# Patient Record
Sex: FEMALE | Race: BLACK OR AFRICAN AMERICAN | NOT HISPANIC OR LATINO
[De-identification: names, ages, dates, MRNs, and addresses within clinical notes are randomized per-mention and may not be internally consistent; named-entity substitution may affect disease eponyms.]

---

## 2019-02-01 ENCOUNTER — APPOINTMENT (OUTPATIENT)
Dept: ANTEPARTUM | Facility: HOSPITAL | Age: 24
End: 2019-02-01

## 2019-02-01 ENCOUNTER — INPATIENT (INPATIENT)
Facility: HOSPITAL | Age: 24
LOS: 2 days | Discharge: HOME | End: 2019-02-04
Attending: OBSTETRICS & GYNECOLOGY | Admitting: OBSTETRICS & GYNECOLOGY
Payer: MEDICAID

## 2019-02-01 VITALS — HEIGHT: 60 IN | WEIGHT: 169.76 LBS

## 2019-02-01 DIAGNOSIS — O60.00 PRETERM LABOR WITHOUT DELIVERY, UNSPECIFIED TRIMESTER: ICD-10-CM

## 2019-02-01 RX ORDER — OXYTOCIN 10 UNIT/ML
333.33 VIAL (ML) INJECTION
Qty: 20 | Refills: 0 | Status: DISCONTINUED | OUTPATIENT
Start: 2019-02-01 | End: 2019-02-02

## 2019-02-01 RX ORDER — SODIUM CHLORIDE 9 MG/ML
1000 INJECTION, SOLUTION INTRAVENOUS
Qty: 0 | Refills: 0 | Status: DISCONTINUED | OUTPATIENT
Start: 2019-02-01 | End: 2019-02-02

## 2019-02-01 RX ORDER — CITRIC ACID/SODIUM CITRATE 300-500 MG
15 SOLUTION, ORAL ORAL EVERY 4 HOURS
Qty: 0 | Refills: 0 | Status: DISCONTINUED | OUTPATIENT
Start: 2019-02-01 | End: 2019-02-02

## 2019-02-01 RX ORDER — SODIUM CHLORIDE 9 MG/ML
1000 INJECTION, SOLUTION INTRAVENOUS ONCE
Qty: 0 | Refills: 0 | Status: DISCONTINUED | OUTPATIENT
Start: 2019-02-01 | End: 2019-02-02

## 2019-02-01 RX ADMIN — SODIUM CHLORIDE 125 MILLILITER(S): 9 INJECTION, SOLUTION INTRAVENOUS at 23:16

## 2019-02-02 ENCOUNTER — ASOB RESULT (OUTPATIENT)
Age: 24
End: 2019-02-02

## 2019-02-02 LAB
APPEARANCE UR: CLEAR — SIGNIFICANT CHANGE UP
BACTERIA # UR AUTO: NEGATIVE — SIGNIFICANT CHANGE UP
BASOPHILS # BLD AUTO: 0.01 K/UL — SIGNIFICANT CHANGE UP (ref 0–0.2)
BASOPHILS NFR BLD AUTO: 0.1 % — SIGNIFICANT CHANGE UP (ref 0–2)
BILIRUB UR-MCNC: NEGATIVE — SIGNIFICANT CHANGE UP
BLD GP AB SCN SERPL QL: NEGATIVE — SIGNIFICANT CHANGE UP
BLOOD UR QL VISUAL: SIGNIFICANT CHANGE UP
COLOR SPEC: YELLOW — SIGNIFICANT CHANGE UP
EOSINOPHIL # BLD AUTO: 0 K/UL — SIGNIFICANT CHANGE UP (ref 0–0.5)
EOSINOPHIL NFR BLD AUTO: 0 % — SIGNIFICANT CHANGE UP (ref 0–6)
GLUCOSE UR-MCNC: NEGATIVE — SIGNIFICANT CHANGE UP
HCT VFR BLD CALC: 28.7 % — LOW (ref 34.5–45)
HGB BLD-MCNC: 8.8 G/DL — LOW (ref 11.5–15.5)
HYALINE CASTS # UR AUTO: SIGNIFICANT CHANGE UP
IMM GRANULOCYTES NFR BLD AUTO: 1.3 % — SIGNIFICANT CHANGE UP (ref 0–1.5)
KETONES UR-MCNC: HIGH
LEUKOCYTE ESTERASE UR-ACNC: NEGATIVE — SIGNIFICANT CHANGE UP
LYMPHOCYTES # BLD AUTO: 1.09 K/UL — SIGNIFICANT CHANGE UP (ref 1–3.3)
LYMPHOCYTES # BLD AUTO: 11.6 % — LOW (ref 13–44)
MCHC RBC-ENTMCNC: 26.1 PG — LOW (ref 27–34)
MCHC RBC-ENTMCNC: 30.7 % — LOW (ref 32–36)
MCV RBC AUTO: 85.2 FL — SIGNIFICANT CHANGE UP (ref 80–100)
MONOCYTES # BLD AUTO: 0.16 K/UL — SIGNIFICANT CHANGE UP (ref 0–0.9)
MONOCYTES NFR BLD AUTO: 1.7 % — LOW (ref 2–14)
MUCOUS THREADS # UR AUTO: SIGNIFICANT CHANGE UP
NEUTROPHILS # BLD AUTO: 7.98 K/UL — HIGH (ref 1.8–7.4)
NEUTROPHILS NFR BLD AUTO: 85.3 % — HIGH (ref 43–77)
NITRITE UR-MCNC: NEGATIVE — SIGNIFICANT CHANGE UP
NRBC # FLD: 0 K/UL — LOW (ref 25–125)
PH UR: 6.5 — SIGNIFICANT CHANGE UP (ref 5–8)
PLATELET # BLD AUTO: 143 K/UL — LOW (ref 150–400)
PMV BLD: 13 FL — SIGNIFICANT CHANGE UP (ref 7–13)
PROT UR-MCNC: 30 — SIGNIFICANT CHANGE UP
RBC # BLD: 3.37 M/UL — LOW (ref 3.8–5.2)
RBC # FLD: 15.2 % — HIGH (ref 10.3–14.5)
RBC CASTS # UR COMP ASSIST: SIGNIFICANT CHANGE UP (ref 0–?)
RH IG SCN BLD-IMP: POSITIVE — SIGNIFICANT CHANGE UP
RH IG SCN BLD-IMP: POSITIVE — SIGNIFICANT CHANGE UP
SP GR SPEC: 1.02 — SIGNIFICANT CHANGE UP (ref 1–1.04)
SQUAMOUS # UR AUTO: SIGNIFICANT CHANGE UP
UROBILINOGEN FLD QL: NORMAL — SIGNIFICANT CHANGE UP
WBC # BLD: 9.36 K/UL — SIGNIFICANT CHANGE UP (ref 3.8–10.5)
WBC # FLD AUTO: 9.36 K/UL — SIGNIFICANT CHANGE UP (ref 3.8–10.5)
WBC UR QL: SIGNIFICANT CHANGE UP (ref 0–?)

## 2019-02-02 PROCEDURE — 99223 1ST HOSP IP/OBS HIGH 75: CPT

## 2019-02-02 RX ORDER — ACETAMINOPHEN 500 MG
1000 TABLET ORAL ONCE
Qty: 0 | Refills: 0 | Status: COMPLETED | OUTPATIENT
Start: 2019-02-02 | End: 2019-02-02

## 2019-02-02 RX ORDER — FOLIC ACID 0.8 MG
1 TABLET ORAL DAILY
Qty: 0 | Refills: 0 | Status: DISCONTINUED | OUTPATIENT
Start: 2019-02-02 | End: 2019-02-04

## 2019-02-02 RX ADMIN — Medication 1 TABLET(S): at 12:14

## 2019-02-02 RX ADMIN — Medication 12 MILLIGRAM(S): at 14:05

## 2019-02-02 RX ADMIN — Medication 1 MILLIGRAM(S): at 12:14

## 2019-02-02 RX ADMIN — Medication 1000 MILLIGRAM(S): at 02:15

## 2019-02-02 RX ADMIN — Medication 400 MILLIGRAM(S): at 01:43

## 2019-02-02 NOTE — PROGRESS NOTE ADULT - SUBJECTIVE AND OBJECTIVE BOX
MFM Attending     I saw and counseled Ms. Bain and performed ultrasound in ATU - please see separate ASOBGYN note for full ultrasound report available on 2019.     Ms. Bain is a 22 year old  at 35 5/7 weeks gestational age who presented as a transfer from outside hospital due to concerns for brain anomaly on ultrasound. 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 She was given one dose of betamethasone and transferred to [facility].    Dated by LMP 2019, report from Dr. Brady Otero DEANA 3/4/19 based on previous sono. PNC with Dr. Lozano at [facility] uncomplicated per patient and late anatomy scan at 30 weeks report from Dr. Mejia reports normal fetal anatomy.     Here, she denies any contractions, reports active FM, denies LOF or VB.     Her vital signs are stable and normal.   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 There is trace pericardial effusion, bilateral trace pleural effusion, possible small ascites, no skin edema. BPP is 10/10.     I reviewed today's ultrasound findings with Ms. Bain. I discussed that findings are consistent with a arteriovenous malformation possibly affecting the vein of Priyank and the cerebral arteries. This can be associated with fetal stroke, periventricular leukomalacia, and hemorrhagic infarction with associated complications but there are no findings consistent with hemorrhage on ultrasound today. I also explained that cardiac failure is an associated complication and occurs more frequently with large aneurysms but because of the differences in the fetal and  circulations, cardiac failure does not often occur until after birth but that delivery at [facility] or [place] will be necessary given need for urgent  evaluation. There are early signs of fetal cardiac compromise today but close monitoring is recommended and delivery will be recommended if fetal hydrops develops.      I explained that  intervention with either surgery or interventional radiology may be necessary and that we would notify neonatology and pediatric neurosurgery.      PLAN:  - Discussed with NICU team, consultation is appreciated.   - Reviewed imaging and findings with interventional radiology and neurosurgery teams at [facility], appreciate consultation.  - Continue inpatient observation and close fetal surveillance with twice daily NSTs and I will repeat BPP tomorrow. She is  corticosteroid complete. DVT prophylaxis.   - Fetal echocardiogram and fetal MRI - will arrange for early in the week.  - Delivery 37 weeks unless clinically indicated sooner. CD can be reserved for obstetrical indications only.     Taya Vance MD  [phone]

## 2019-02-03 LAB
BACTERIA UR CULT: SIGNIFICANT CHANGE UP
SPECIMEN SOURCE: SIGNIFICANT CHANGE UP
SPECIMEN SOURCE: SIGNIFICANT CHANGE UP
T PALLIDUM AB TITR SER: NEGATIVE — SIGNIFICANT CHANGE UP

## 2019-02-03 PROCEDURE — 99233 SBSQ HOSP IP/OBS HIGH 50: CPT

## 2019-02-03 RX ORDER — HEPARIN SODIUM 5000 [USP'U]/ML
5000 INJECTION INTRAVENOUS; SUBCUTANEOUS EVERY 12 HOURS
Qty: 0 | Refills: 0 | Status: DISCONTINUED | OUTPATIENT
Start: 2019-02-03 | End: 2019-02-04

## 2019-02-03 RX ORDER — DOCUSATE SODIUM 100 MG
100 CAPSULE ORAL
Qty: 0 | Refills: 0 | Status: DISCONTINUED | OUTPATIENT
Start: 2019-02-03 | End: 2019-02-04

## 2019-02-03 RX ADMIN — HEPARIN SODIUM 5000 UNIT(S): 5000 INJECTION INTRAVENOUS; SUBCUTANEOUS at 20:24

## 2019-02-03 RX ADMIN — Medication 1 MILLIGRAM(S): at 11:35

## 2019-02-03 RX ADMIN — Medication 1 TABLET(S): at 11:35

## 2019-02-03 RX ADMIN — HEPARIN SODIUM 5000 UNIT(S): 5000 INJECTION INTRAVENOUS; SUBCUTANEOUS at 06:17

## 2019-02-04 ENCOUNTER — APPOINTMENT (OUTPATIENT)
Dept: ANTEPARTUM | Facility: HOSPITAL | Age: 24
End: 2019-02-04

## 2019-02-04 ENCOUNTER — TRANSCRIPTION ENCOUNTER (OUTPATIENT)
Age: 24
End: 2019-02-04

## 2019-02-04 ENCOUNTER — ASOB RESULT (OUTPATIENT)
Age: 24
End: 2019-02-04

## 2019-02-04 VITALS
TEMPERATURE: 98 F | DIASTOLIC BLOOD PRESSURE: 69 MMHG | SYSTOLIC BLOOD PRESSURE: 116 MMHG | OXYGEN SATURATION: 100 % | RESPIRATION RATE: 18 BRPM | HEART RATE: 87 BPM

## 2019-02-04 PROBLEM — Z00.00 ENCOUNTER FOR PREVENTIVE HEALTH EXAMINATION: Status: ACTIVE | Noted: 2019-02-04

## 2019-02-04 LAB — GP B STREP GENITAL QL CULT: SIGNIFICANT CHANGE UP

## 2019-02-04 PROCEDURE — 74712 MRI FETAL SNGL/1ST GESTATION: CPT | Mod: 26

## 2019-02-04 PROCEDURE — 93325 DOPPLER ECHO COLOR FLOW MAPG: CPT | Mod: 26

## 2019-02-04 PROCEDURE — 76827 ECHO EXAM OF FETAL HEART: CPT | Mod: 26

## 2019-02-04 PROCEDURE — 76825 ECHO EXAM OF FETAL HEART: CPT | Mod: 26

## 2019-02-04 RX ORDER — FOLIC ACID 0.8 MG
1 TABLET ORAL
Qty: 0 | Refills: 0 | COMMUNITY
Start: 2019-02-04

## 2019-02-04 RX ORDER — DOCUSATE SODIUM 100 MG
1 CAPSULE ORAL
Qty: 0 | Refills: 0 | COMMUNITY
Start: 2019-02-04

## 2019-02-04 RX ADMIN — Medication 1 MILLIGRAM(S): at 11:51

## 2019-02-04 RX ADMIN — Medication 1 TABLET(S): at 11:51

## 2019-02-04 RX ADMIN — HEPARIN SODIUM 5000 UNIT(S): 5000 INJECTION INTRAVENOUS; SUBCUTANEOUS at 09:38

## 2019-02-04 NOTE — DISCHARGE NOTE ADULT - CARE PROVIDER_API CALL
Fleischer, Adiel (MD)  MaternalFetal Medicine; Obstetrics and Gynecology  [address] [place] [address], [address] [place] [address]  Phone: [phone]  Fax: [phone]

## 2019-02-04 NOTE — DISCHARGE NOTE ADULT - HOSPITAL COURSE
Patient presented as transfer from [facility] for  contractions and abnormalities seen on fetal ultrasound.  In hospital, she received steroids (2/-2), ultrasound demonstrated vein of randee malformation, pericardial effusions.  Fetal MRI and echo performed.  Seen by NICU and Neurosurgery    Patient will follow up in [facility].  Delivery at 37 weeks.

## 2019-02-04 NOTE — DISCHARGE NOTE ANTEPARTUM - MEDICATION SUMMARY - MEDICATIONS TO CHANGE
I will SWITCH the dose or number of times a day I take the medications listed below when I get home from the hospital:  None
maternal medical condition/gestational hypertension

## 2019-02-04 NOTE — DISCHARGE NOTE ANTEPARTUM - CARE PROVIDER_API CALL
Fleischer, Adiel (MD)  MaternalFetal Medicine; Obstetrics and Gynecology  [address] [place] [address], [address] [place] [address]  Phone: [phone]  Fax: [phone]

## 2019-02-04 NOTE — DISCHARGE NOTE ANTEPARTUM - CARE PLAN
Principal Discharge DX:	 contractions  Goal:	recovery  Assessment and plan of treatment:	Follow up in high risk clinic  nothing in vagina (sex, tampons, douching) no heavy lifting (>10 lbs) for 6 weeks. Please return to ER or call your doctors office if pain is worsening, you are unable to keep down food or drink, fever >101, heavy vaginal bleeding. You are able to take a shower regularly.  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  Secondary Diagnosis:	Vein of Priyank brain malformation

## 2019-02-04 NOTE — DISCHARGE NOTE ANTEPARTUM - CARE PROVIDERS DIRECT ADDRESSES
[email or web address]

## 2019-02-04 NOTE — DISCHARGE NOTE ANTEPARTUM - PLAN OF CARE
recovery Follow up in high risk clinic  nothing in vagina (sex, tampons, douching) no heavy lifting (>10 lbs) for 6 weeks. Please return to ER or call your doctors office if pain is worsening, you are unable to keep down food or drink, fever >101, heavy vaginal bleeding. You are able to take a shower regularly.  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

## 2019-02-04 NOTE — DISCHARGE NOTE ANTEPARTUM - MEDICATION SUMMARY - MEDICATIONS TO TAKE
I will START or STAY ON the medications listed below when I get home from the hospital:    Prenatal Multivitamins with Folic Acid 1 mg oral tablet  -- 1 tab(s) by mouth once a day  -- Indication: For Hoem    docusate sodium 100 mg oral capsule  -- 1 cap(s) by mouth 2 times a day, As needed, Constipation  -- Indication: For Home    folic acid 1 mg oral tablet  -- 1 tab(s) by mouth once a day  -- Indication: For Home

## 2019-02-04 NOTE — DISCHARGE NOTE ANTEPARTUM - PATIENT PORTAL LINK FT
You can access the [facility] Patient Portal, offered by [facility], by registering with the following website: [email or web address]

## 2019-02-04 NOTE — DISCHARGE NOTE ADULT - CARE PLAN
Principal Discharge DX:	 contractions  Goal:	recovery  Assessment and plan of treatment:	Follow up in high risk clinic  nothing in vagina (sex, tampons, douching) no heavy lifting (>10 lbs) for 6 weeks. Please return to ER or call your doctors office if pain is worsening, you are unable to keep down food or drink, fever >101, heavy vaginal bleeding. You are able to take a shower regularly.  Secondary Diagnosis:	Vein of Priyank brain malformation

## 2019-02-04 NOTE — DISCHARGE NOTE ADULT - PLAN OF CARE
recovery Follow up in high risk clinic  nothing in vagina (sex, tampons, douching) no heavy lifting (>10 lbs) for 6 weeks. Please return to ER or call your doctors office if pain is worsening, you are unable to keep down food or drink, fever >101, heavy vaginal bleeding. You are able to take a shower regularly.

## 2019-02-04 NOTE — DISCHARGE NOTE ADULT - PATIENT PORTAL LINK FT
You can access the [facility] Patient Portal, offered by [facility], by registering with the following website: [email or web address]

## 2019-02-04 NOTE — DISCHARGE NOTE ANTEPARTUM - HOSPITAL COURSE
Patient presented as transfer from [facility] for  contractions and abnormalities seen on fetal ultrasound.  In hospital, she received steroids (2/-2), ultrasound demonstrated vein of randee malformation, pericardial effusions.  Fetal MRI and echo performed.  Seen by NICU and Neurosurgery    Patient will follow up in [facility].  Delivery at 37 weeks.

## 2019-02-04 NOTE — DISCHARGE NOTE ADULT - MEDICATION SUMMARY - MEDICATIONS TO TAKE
I will START or STAY ON the medications listed below when I get home from the hospital:    Prenatal Multivitamins with Folic Acid 1 mg oral tablet  -- 1 tab(s) by mouth once a day  -- Indication: For Home    docusate sodium 100 mg oral capsule  -- 1 cap(s) by mouth 2 times a day, As needed, Constipation  -- Indication: For Home    folic acid 1 mg oral tablet  -- 1 tab(s) by mouth once a day  -- Indication: For Home

## 2019-02-06 ENCOUNTER — LABORATORY RESULT (OUTPATIENT)
Age: 24
End: 2019-02-06

## 2019-02-06 ENCOUNTER — OUTPATIENT (OUTPATIENT)
Dept: OUTPATIENT SERVICES | Facility: HOSPITAL | Age: 24
LOS: 1 days | End: 2019-02-06
Payer: MEDICAID

## 2019-02-06 ENCOUNTER — OUTPATIENT (OUTPATIENT)
Dept: OUTPATIENT SERVICES | Facility: HOSPITAL | Age: 24
LOS: 1 days | End: 2019-02-06

## 2019-02-06 ENCOUNTER — NON-APPOINTMENT (OUTPATIENT)
Age: 24
End: 2019-02-06

## 2019-02-06 ENCOUNTER — APPOINTMENT (OUTPATIENT)
Dept: ANTEPARTUM | Facility: CLINIC | Age: 24
End: 2019-02-06
Payer: MEDICAID

## 2019-02-06 ENCOUNTER — ASOB RESULT (OUTPATIENT)
Age: 24
End: 2019-02-06

## 2019-02-06 ENCOUNTER — RESULT REVIEW (OUTPATIENT)
Age: 24
End: 2019-02-06

## 2019-02-06 ENCOUNTER — APPOINTMENT (OUTPATIENT)
Dept: OBGYN | Facility: HOSPITAL | Age: 24
End: 2019-02-06

## 2019-02-06 VITALS
BODY MASS INDEX: 33.77 KG/M2 | HEIGHT: 60 IN | SYSTOLIC BLOOD PRESSURE: 110 MMHG | WEIGHT: 172 LBS | HEART RATE: 71 BPM | DIASTOLIC BLOOD PRESSURE: 69 MMHG

## 2019-02-06 DIAGNOSIS — Z83.3 FAMILY HISTORY OF DIABETES MELLITUS: ICD-10-CM

## 2019-02-06 DIAGNOSIS — Z80.3 FAMILY HISTORY OF MALIGNANT NEOPLASM OF BREAST: ICD-10-CM

## 2019-02-06 DIAGNOSIS — O09.93 SUPERVISION OF HIGH RISK PREGNANCY, UNSPECIFIED, THIRD TRIMESTER: ICD-10-CM

## 2019-02-06 DIAGNOSIS — O35.8XX0 MATERNAL CARE FOR OTHER (SUSPECTED) FETAL ABNORMALITY AND DAMAGE, NOT APPLICABLE OR UNSPECIFIED: ICD-10-CM

## 2019-02-06 DIAGNOSIS — D64.9 ANEMIA, UNSPECIFIED: ICD-10-CM

## 2019-02-06 DIAGNOSIS — Z34.90 ENCOUNTER FOR SUPERVISION OF NORMAL PREGNANCY, UNSPECIFIED, UNSPECIFIED TRIMESTER: ICD-10-CM

## 2019-02-06 LAB
ALBUMIN SERPL ELPH-MCNC: 3.7 G/DL — SIGNIFICANT CHANGE UP (ref 3.3–5)
ALP SERPL-CCNC: 138 U/L — HIGH (ref 40–120)
ALT FLD-CCNC: 15 U/L — SIGNIFICANT CHANGE UP (ref 4–33)
ANION GAP SERPL CALC-SCNC: 16 MMO/L — HIGH (ref 7–14)
AST SERPL-CCNC: 22 U/L — SIGNIFICANT CHANGE UP (ref 4–32)
BASOPHILS # BLD AUTO: 0.02 K/UL — SIGNIFICANT CHANGE UP (ref 0–0.2)
BASOPHILS NFR BLD AUTO: 0.2 % — SIGNIFICANT CHANGE UP (ref 0–2)
BILIRUB SERPL-MCNC: 0.3 MG/DL — SIGNIFICANT CHANGE UP (ref 0.2–1.2)
BUN SERPL-MCNC: 5 MG/DL — LOW (ref 7–23)
CALCIUM SERPL-MCNC: 9.5 MG/DL — SIGNIFICANT CHANGE UP (ref 8.4–10.5)
CHLORIDE SERPL-SCNC: 101 MMOL/L — SIGNIFICANT CHANGE UP (ref 98–107)
CO2 SERPL-SCNC: 21 MMOL/L — LOW (ref 22–31)
CREAT SERPL-MCNC: 0.5 MG/DL — SIGNIFICANT CHANGE UP (ref 0.5–1.3)
EOSINOPHIL # BLD AUTO: 0.06 K/UL — SIGNIFICANT CHANGE UP (ref 0–0.5)
EOSINOPHIL NFR BLD AUTO: 0.6 % — SIGNIFICANT CHANGE UP (ref 0–6)
GLUCOSE PRE/P GLC SERPL-SCNC: 87 — SIGNIFICANT CHANGE UP (ref 65–115)
GLUCOSE SERPL-MCNC: 85 MG/DL — SIGNIFICANT CHANGE UP (ref 70–99)
HBA1C BLD-MCNC: 5.2 % — SIGNIFICANT CHANGE UP (ref 4–5.6)
HBV SURFACE AG SER-ACNC: NONREACTIVE — SIGNIFICANT CHANGE UP
HCT VFR BLD CALC: 30 % — LOW (ref 34.5–45)
HCV AB S/CO SERPL IA: 0.1 S/CO — SIGNIFICANT CHANGE UP
HCV AB SERPL-IMP: SIGNIFICANT CHANGE UP
HGB BLD-MCNC: 8.9 G/DL — LOW (ref 11.5–15.5)
HIV 1+2 AB+HIV1 P24 AG SERPL QL IA: SIGNIFICANT CHANGE UP
IMM GRANULOCYTES NFR BLD AUTO: 3.9 % — HIGH (ref 0–1.5)
IRON SATN MFR SERPL: 594 UG/DL — HIGH (ref 140–530)
IRON SATN MFR SERPL: 62 UG/DL — SIGNIFICANT CHANGE UP (ref 30–160)
LYMPHOCYTES # BLD AUTO: 1.74 K/UL — SIGNIFICANT CHANGE UP (ref 1–3.3)
LYMPHOCYTES # BLD AUTO: 18.6 % — SIGNIFICANT CHANGE UP (ref 13–44)
MCHC RBC-ENTMCNC: 25.9 PG — LOW (ref 27–34)
MCHC RBC-ENTMCNC: 29.7 % — LOW (ref 32–36)
MCV RBC AUTO: 87.2 FL — SIGNIFICANT CHANGE UP (ref 80–100)
MONOCYTES # BLD AUTO: 0.59 K/UL — SIGNIFICANT CHANGE UP (ref 0–0.9)
MONOCYTES NFR BLD AUTO: 6.3 % — SIGNIFICANT CHANGE UP (ref 2–14)
NEUTROPHILS # BLD AUTO: 6.56 K/UL — SIGNIFICANT CHANGE UP (ref 1.8–7.4)
NEUTROPHILS NFR BLD AUTO: 70.4 % — SIGNIFICANT CHANGE UP (ref 43–77)
NRBC # FLD: 0 K/UL — LOW (ref 25–125)
PLATELET # BLD AUTO: 190 K/UL — SIGNIFICANT CHANGE UP (ref 150–400)
PMV BLD: 12.4 FL — SIGNIFICANT CHANGE UP (ref 7–13)
POTASSIUM SERPL-MCNC: 3.9 MMOL/L — SIGNIFICANT CHANGE UP (ref 3.5–5.3)
POTASSIUM SERPL-SCNC: 3.9 MMOL/L — SIGNIFICANT CHANGE UP (ref 3.5–5.3)
PROT SERPL-MCNC: 6.8 G/DL — SIGNIFICANT CHANGE UP (ref 6–8.3)
RBC # BLD: 3.44 M/UL — LOW (ref 3.8–5.2)
RBC # FLD: 15.8 % — HIGH (ref 10.3–14.5)
SODIUM SERPL-SCNC: 138 MMOL/L — SIGNIFICANT CHANGE UP (ref 135–145)
TSH SERPL-MCNC: 1.78 UIU/ML — SIGNIFICANT CHANGE UP (ref 0.27–4.2)
UIBC SERPL-MCNC: 531.6 UG/DL — HIGH (ref 110–370)
URATE SERPL-MCNC: 4.4 MG/DL — SIGNIFICANT CHANGE UP (ref 2.5–7)
WBC # BLD: 9.33 K/UL — SIGNIFICANT CHANGE UP (ref 3.8–10.5)
WBC # FLD AUTO: 9.33 K/UL — SIGNIFICANT CHANGE UP (ref 3.8–10.5)

## 2019-02-06 PROCEDURE — 76818 FETAL BIOPHYS PROFILE W/NST: CPT | Mod: 26

## 2019-02-06 PROCEDURE — G0452: CPT

## 2019-02-06 PROCEDURE — 76820 UMBILICAL ARTERY ECHO: CPT | Mod: 26

## 2019-02-06 PROCEDURE — 76821 MIDDLE CEREBRAL ARTERY ECHO: CPT | Mod: 26

## 2019-02-06 PROCEDURE — 76828 ECHO EXAM OF FETAL HEART: CPT | Mod: 26

## 2019-02-07 LAB
C TRACH RRNA SPEC QL NAA+PROBE: SIGNIFICANT CHANGE UP
HGB A MFR BLD: 97 % — SIGNIFICANT CHANGE UP
HGB A2 MFR BLD: 2.7 % — SIGNIFICANT CHANGE UP (ref 2.4–3.5)
HGB ELECT COMMENT: SIGNIFICANT CHANGE UP
HGB F MFR BLD: < 1 % — SIGNIFICANT CHANGE UP (ref 0–1.5)
N GONORRHOEA RRNA SPEC QL NAA+PROBE: SIGNIFICANT CHANGE UP
RUBV IGG SER-ACNC: 1.4 INDEX — SIGNIFICANT CHANGE UP
RUBV IGG SER-IMP: POSITIVE — SIGNIFICANT CHANGE UP
SPECIMEN SOURCE: SIGNIFICANT CHANGE UP
VZV IGG FLD QL IA: 19.9 INDEX — SIGNIFICANT CHANGE UP
VZV IGG FLD QL IA: NEGATIVE — SIGNIFICANT CHANGE UP

## 2019-02-08 ENCOUNTER — ASOB RESULT (OUTPATIENT)
Age: 24
End: 2019-02-08

## 2019-02-08 ENCOUNTER — APPOINTMENT (OUTPATIENT)
Dept: ANTEPARTUM | Facility: CLINIC | Age: 24
End: 2019-02-08
Payer: MEDICAID

## 2019-02-08 ENCOUNTER — APPOINTMENT (OUTPATIENT)
Dept: ANTEPARTUM | Facility: HOSPITAL | Age: 24
End: 2019-02-08

## 2019-02-08 ENCOUNTER — OUTPATIENT (OUTPATIENT)
Dept: OUTPATIENT SERVICES | Facility: HOSPITAL | Age: 24
LOS: 1 days | End: 2019-02-08

## 2019-02-08 LAB
GAMMA INTERFERON BACKGROUND BLD IA-ACNC: 0.01 IU/ML — SIGNIFICANT CHANGE UP
LEAD SERPL-MCNC: < 1 UG/DL — SIGNIFICANT CHANGE UP (ref 0–4)
M TB IFN-G BLD-IMP: NEGATIVE — SIGNIFICANT CHANGE UP
M TB IFN-G CD4+ BCKGRND COR BLD-ACNC: 0 IU/ML — SIGNIFICANT CHANGE UP
M TB IFN-G CD4+CD8+ BCKGRND COR BLD-ACNC: 0 IU/ML — SIGNIFICANT CHANGE UP
QUANT TB PLUS MITOGEN MINUS NIL: 1.14 IU/ML — SIGNIFICANT CHANGE UP

## 2019-02-08 PROCEDURE — 76818 FETAL BIOPHYS PROFILE W/NST: CPT | Mod: 26

## 2019-02-08 PROCEDURE — 76828 ECHO EXAM OF FETAL HEART: CPT | Mod: 26

## 2019-02-08 PROCEDURE — 76820 UMBILICAL ARTERY ECHO: CPT | Mod: 26

## 2019-02-08 PROCEDURE — 76821 MIDDLE CEREBRAL ARTERY ECHO: CPT | Mod: 26

## 2019-02-10 LAB — CYTOLOGY SPEC DOC CYTO: SIGNIFICANT CHANGE UP

## 2019-02-11 ENCOUNTER — OUTPATIENT (OUTPATIENT)
Dept: OUTPATIENT SERVICES | Facility: HOSPITAL | Age: 24
LOS: 1 days | End: 2019-02-11

## 2019-02-11 ENCOUNTER — APPOINTMENT (OUTPATIENT)
Dept: ANTEPARTUM | Facility: CLINIC | Age: 24
End: 2019-02-11
Payer: MEDICAID

## 2019-02-11 ENCOUNTER — APPOINTMENT (OUTPATIENT)
Dept: OBGYN | Facility: HOSPITAL | Age: 24
End: 2019-02-11
Payer: MEDICAID

## 2019-02-11 ENCOUNTER — ASOB RESULT (OUTPATIENT)
Age: 24
End: 2019-02-11

## 2019-02-11 ENCOUNTER — APPOINTMENT (OUTPATIENT)
Dept: ANTEPARTUM | Facility: HOSPITAL | Age: 24
End: 2019-02-11

## 2019-02-11 VITALS
BODY MASS INDEX: 33.57 KG/M2 | HEIGHT: 60 IN | SYSTOLIC BLOOD PRESSURE: 123 MMHG | DIASTOLIC BLOOD PRESSURE: 77 MMHG | HEART RATE: 76 BPM | WEIGHT: 171 LBS

## 2019-02-11 DIAGNOSIS — O09.93 SUPERVISION OF HIGH RISK PREGNANCY, UNSPECIFIED, THIRD TRIMESTER: ICD-10-CM

## 2019-02-11 DIAGNOSIS — O35.9XX0 MATERNAL CARE FOR (SUSPECTED) FETAL ABNORMALITY AND DAMAGE, UNSPECIFIED, NOT APPLICABLE OR UNSPECIFIED: ICD-10-CM

## 2019-02-11 PROCEDURE — 81003 URINALYSIS AUTO W/O SCOPE: CPT | Mod: NC,GC,QW

## 2019-02-11 PROCEDURE — 76820 UMBILICAL ARTERY ECHO: CPT | Mod: 26

## 2019-02-11 PROCEDURE — 76828 ECHO EXAM OF FETAL HEART: CPT | Mod: 26

## 2019-02-11 PROCEDURE — 99213 OFFICE O/P EST LOW 20 MIN: CPT | Mod: 25,GC,TH

## 2019-02-11 PROCEDURE — 76377 3D RENDER W/INTRP POSTPROCES: CPT | Mod: 26,59

## 2019-02-11 PROCEDURE — 76821 MIDDLE CEREBRAL ARTERY ECHO: CPT | Mod: 26

## 2019-02-11 PROCEDURE — 76818 FETAL BIOPHYS PROFILE W/NST: CPT | Mod: 26

## 2019-02-12 ENCOUNTER — INPATIENT (INPATIENT)
Facility: HOSPITAL | Age: 24
LOS: 3 days | Discharge: HOME | End: 2019-02-16
Attending: OBSTETRICS & GYNECOLOGY | Admitting: OBSTETRICS & GYNECOLOGY
Payer: MEDICAID

## 2019-02-12 ENCOUNTER — TRANSCRIPTION ENCOUNTER (OUTPATIENT)
Age: 24
End: 2019-02-12

## 2019-02-12 VITALS — HEIGHT: 60 IN | WEIGHT: 171.08 LBS

## 2019-02-12 DIAGNOSIS — O35.8XX0 MATERNAL CARE FOR OTHER (SUSPECTED) FETAL ABNORMALITY AND DAMAGE, NOT APPLICABLE OR UNSPECIFIED: ICD-10-CM

## 2019-02-12 LAB
ALBUMIN SERPL ELPH-MCNC: 3.4 G/DL — SIGNIFICANT CHANGE UP (ref 3.3–5)
ALP SERPL-CCNC: 143 U/L — HIGH (ref 40–120)
ALT FLD-CCNC: 11 U/L — SIGNIFICANT CHANGE UP (ref 4–33)
ANION GAP SERPL CALC-SCNC: 15 MMO/L — HIGH (ref 7–14)
AST SERPL-CCNC: 18 U/L — SIGNIFICANT CHANGE UP (ref 4–32)
BASOPHILS # BLD AUTO: 0.02 K/UL — SIGNIFICANT CHANGE UP (ref 0–0.2)
BASOPHILS NFR BLD AUTO: 0.2 % — SIGNIFICANT CHANGE UP (ref 0–2)
BILIRUB SERPL-MCNC: 0.3 MG/DL — SIGNIFICANT CHANGE UP (ref 0.2–1.2)
BLD GP AB SCN SERPL QL: NEGATIVE — SIGNIFICANT CHANGE UP
BUN SERPL-MCNC: 7 MG/DL — SIGNIFICANT CHANGE UP (ref 7–23)
CALCIUM SERPL-MCNC: 9 MG/DL — SIGNIFICANT CHANGE UP (ref 8.4–10.5)
CHLORIDE SERPL-SCNC: 103 MMOL/L — SIGNIFICANT CHANGE UP (ref 98–107)
CO2 SERPL-SCNC: 19 MMOL/L — LOW (ref 22–31)
CREAT SERPL-MCNC: 0.47 MG/DL — LOW (ref 0.5–1.3)
EOSINOPHIL # BLD AUTO: 0.03 K/UL — SIGNIFICANT CHANGE UP (ref 0–0.5)
EOSINOPHIL NFR BLD AUTO: 0.3 % — SIGNIFICANT CHANGE UP (ref 0–6)
GLUCOSE SERPL-MCNC: 102 MG/DL — HIGH (ref 70–99)
HCT VFR BLD CALC: 28.8 % — LOW (ref 34.5–45)
HGB BLD-MCNC: 8.5 G/DL — LOW (ref 11.5–15.5)
IMM GRANULOCYTES NFR BLD AUTO: 2.2 % — HIGH (ref 0–1.5)
LYMPHOCYTES # BLD AUTO: 1.68 K/UL — SIGNIFICANT CHANGE UP (ref 1–3.3)
LYMPHOCYTES # BLD AUTO: 19.3 % — SIGNIFICANT CHANGE UP (ref 13–44)
MAGNESIUM SERPL-MCNC: 1.7 MG/DL — SIGNIFICANT CHANGE UP (ref 1.6–2.6)
MCHC RBC-ENTMCNC: 25.7 PG — LOW (ref 27–34)
MCHC RBC-ENTMCNC: 29.5 % — LOW (ref 32–36)
MCV RBC AUTO: 87 FL — SIGNIFICANT CHANGE UP (ref 80–100)
MONOCYTES # BLD AUTO: 0.5 K/UL — SIGNIFICANT CHANGE UP (ref 0–0.9)
MONOCYTES NFR BLD AUTO: 5.7 % — SIGNIFICANT CHANGE UP (ref 2–14)
NEUTROPHILS # BLD AUTO: 6.29 K/UL — SIGNIFICANT CHANGE UP (ref 1.8–7.4)
NEUTROPHILS NFR BLD AUTO: 72.3 % — SIGNIFICANT CHANGE UP (ref 43–77)
NRBC # FLD: 0 K/UL — LOW (ref 25–125)
PHOSPHATE SERPL-MCNC: 2.7 MG/DL — SIGNIFICANT CHANGE UP (ref 2.5–4.5)
PLATELET # BLD AUTO: 190 K/UL — SIGNIFICANT CHANGE UP (ref 150–400)
PMV BLD: 12.8 FL — SIGNIFICANT CHANGE UP (ref 7–13)
POTASSIUM SERPL-MCNC: 3.6 MMOL/L — SIGNIFICANT CHANGE UP (ref 3.5–5.3)
POTASSIUM SERPL-SCNC: 3.6 MMOL/L — SIGNIFICANT CHANGE UP (ref 3.5–5.3)
PROT SERPL-MCNC: 6.5 G/DL — SIGNIFICANT CHANGE UP (ref 6–8.3)
RBC # BLD: 3.31 M/UL — LOW (ref 3.8–5.2)
RBC # FLD: 16.3 % — HIGH (ref 10.3–14.5)
RH IG SCN BLD-IMP: POSITIVE — SIGNIFICANT CHANGE UP
SODIUM SERPL-SCNC: 137 MMOL/L — SIGNIFICANT CHANGE UP (ref 135–145)
T PALLIDUM AB TITR SER: NEGATIVE — SIGNIFICANT CHANGE UP
WBC # BLD: 8.71 K/UL — SIGNIFICANT CHANGE UP (ref 3.8–10.5)
WBC # FLD AUTO: 8.71 K/UL — SIGNIFICANT CHANGE UP (ref 3.8–10.5)

## 2019-02-12 PROCEDURE — 99233 SBSQ HOSP IP/OBS HIGH 50: CPT

## 2019-02-12 PROCEDURE — 93010 ELECTROCARDIOGRAM REPORT: CPT

## 2019-02-12 RX ORDER — OXYTOCIN 10 UNIT/ML
333.33 VIAL (ML) INJECTION
Qty: 20 | Refills: 0 | Status: DISCONTINUED | OUTPATIENT
Start: 2019-02-12 | End: 2019-02-13

## 2019-02-12 RX ORDER — CITRIC ACID/SODIUM CITRATE 300-500 MG
15 SOLUTION, ORAL ORAL EVERY 4 HOURS
Qty: 0 | Refills: 0 | Status: DISCONTINUED | OUTPATIENT
Start: 2019-02-12 | End: 2019-02-13

## 2019-02-12 RX ORDER — SODIUM CHLORIDE 9 MG/ML
1000 INJECTION, SOLUTION INTRAVENOUS
Qty: 0 | Refills: 0 | Status: DISCONTINUED | OUTPATIENT
Start: 2019-02-12 | End: 2019-02-12

## 2019-02-12 RX ORDER — SODIUM CHLORIDE 9 MG/ML
1000 INJECTION, SOLUTION INTRAVENOUS
Qty: 0 | Refills: 0 | Status: DISCONTINUED | OUTPATIENT
Start: 2019-02-12 | End: 2019-02-13

## 2019-02-12 RX ORDER — SODIUM CHLORIDE 9 MG/ML
1000 INJECTION, SOLUTION INTRAVENOUS ONCE
Qty: 0 | Refills: 0 | Status: DISCONTINUED | OUTPATIENT
Start: 2019-02-12 | End: 2019-02-13

## 2019-02-12 RX ORDER — SODIUM CHLORIDE 9 MG/ML
1000 INJECTION, SOLUTION INTRAVENOUS ONCE
Qty: 0 | Refills: 0 | Status: DISCONTINUED | OUTPATIENT
Start: 2019-02-12 | End: 2019-02-12

## 2019-02-12 RX ADMIN — SODIUM CHLORIDE 125 MILLILITER(S): 9 INJECTION, SOLUTION INTRAVENOUS at 18:09

## 2019-02-12 RX ADMIN — SODIUM CHLORIDE 125 MILLILITER(S): 9 INJECTION, SOLUTION INTRAVENOUS at 14:34

## 2019-02-12 NOTE — CONSULT NOTE ADULT - ASSESSMENT
23 year old F currently 37 weeks pregnant w/ no PMH that presents as a direct admission after her child was discovered on MRI to have vein of Priyank aneurysmal malformation. One treatment option for the fetal cardiomyopathy that can develop is digoxin, and is being discussed in this pt.     #Digoxin initiation  - Plan is to start 0.5 Digoxin q6 to get to a Digoxin level 2.  - After which the pt would be maintained at 0.5 PO BID till delivery.  - If Dig is to be initiated, would check twice daily EKGs  - Check daily dig level (first check would be after two doses)  - Avoid hypokalemia and hypomagnesemia   - Monitor for hypercalcemia   - Symptoms to monitor for include: Nausea, vomiting, diarrhea, abd pain, disorientation, confusion, visual disturbances, and altered color perception.  - On ECG will also monitor for atrial tachycardias, AV block, digitalis effect, a-fib, and bradycardia.     Tino Breen MD  Cardiology Fellow - PGY-4  [facility]: [id number]  NS: [phone]  [id number]

## 2019-02-12 NOTE — CONSULT NOTE ADULT - ASSESSMENT
23 year old admitted 2/12 for induction of labor and delivery of baby with VOGM.     -Discussed with primary team (MFM) and patient that in anticipation of baby with some degree of cardiac failure 2/2 VOGM, digoxin should be administered to mother per dosing below and delivery should be postponed until digoxin level approaches 2 ng/dL in mother.     Under cardiac monitoring, administer 0.5 mg IV q6 to mother until maternal serum level is close to 2 ng/dL. At this point, digoxin IV should be switch to digoxin PO 0.5 mg BID to maintain serum level at 2.    Evidence for this recommendation comes from: Pancho CROWELL, Evelin HENDERSONT, Ruchi Y, Link S, Tom R, Geronimo S, et al. Vein of Priyank malformations in neonates: New management paradigms for improving outcomes. Neurosurgery. 2012;70:1207–13.      -Recommend Adult cardiology to evaluate mother to begin digoxin  -Neurosurgery to remain available for any intervention to embolize VOGM if needed  -Will continue to follow. Please page neurosurgery at [phone] with any questions. Neurosurgery attending Dr. Camarena and neuro IR Dr. Rolon aware of plan.

## 2019-02-12 NOTE — CONSULT NOTE ADULT - SUBJECTIVE AND OBJECTIVE BOX
Patient seen and evaluated at bedside    Chief Complaint: Transfer from OSH.     HPI:  Pt is a 23 year old F currently 37 weeks pregnant w/ no PMH that presents as a direct admission after her child was discovered on MRI to have vein of Priyank aneurysmal malformation. Her child was also found to have massive cardiomegaly and mild left hydronephrosis. After transfer the patient was assessed by pediatric neurosurgery and MFM, and a possible regimen of digoxin was considered given favorable results in a study. Cardiology called for Digoxin initiation.     PMHx:   None     PSHx:   None     Allergies:  No Known Allergies      Home Meds: None     Current Medications:   citric acid/sodium citrate Solution 15 milliLiter(s) Oral every 4 hours  lactated ringers Bolus 1000 milliLiter(s) IV Bolus once  lactated ringers. 1000 milliLiter(s) IV Continuous <Continuous>  oxytocin Infusion 333.333 milliUNIT(s)/Min IV Continuous <Continuous>      FAMILY HISTORY:  no family hx of cardiac disease.     Social History: Independent of ADLs  Smoking History: Never  Alcohol Use: Occasional   Drug Use: None     REVIEW OF SYSTEMS:  Constitutional:     [ ] negative [ ] fevers [ ] chills [ ] weight loss [ ] weight gain  HEENT:                  [ ] negative [ ] dry eyes [ ] eye irritation [ ] postnasal drip [ ] nasal congestion  CV:                         [ ] negative  [ ] chest pain [ ] orthopnea [ ] palpitations [ ] murmur  Resp:                     [ ] negative [ ] cough [ ] shortness of breath [ ] dyspnea [ ] wheezing [ ] sputum [ ]hemoptysis  GI:                          [ ] negative [ ] nausea [ ] vomiting [ ] diarrhea [ ] constipation [ ] abd pain [ ] dysphagia   :                        [ ] negative [ ] dysuria [ ] nocturia [ ] hematuria [ ] increased urinary frequency  Musculoskeletal: [ ] negative [ ] back pain [ ] myalgias [ ] arthralgias [ ] fracture  Skin:                       [ ] negative [ ] rash [ ] itch  Neurological:        [ ] negative [ ] headache [ ] dizziness [ ] syncope [ ] weakness [ ] numbness  Psychiatric:           [ ] negative [ ] anxiety [ ] depression  Endocrine:            [ ] negative [ ] diabetes [ ] thyroid problem  Heme/Lymph:      [ ] negative [ ] anemia [ ] bleeding problem  Allergic/Immune: [ ] negative [ ] itchy eyes [ ] nasal discharge [ ] hives [ ] angioedema    [ x] All other systems negative  [ ] Unable to assess ROS because sedated with anoxic brain injury.      Physical Exam:  T(F): --  HR: --  BP: --  RR: --  SpO2: --  GENERAL: No acute distress, well-developed  HEAD:  Atraumatic, Normocephalic  ENT: EOMI, PERRLA, conjunctiva and sclera clear, Neck supple, No JVD, moist mucosa  CHEST/LUNG: Clear to auscultation bilaterally; No wheeze, equal breath sounds bilaterally   BACK: No spinal tenderness  HEART: Regular rate and rhythm; No murmurs, rubs, or gallops  ABDOMEN: pregnant   EXTREMITIES:  No clubbing, cyanosis, or edema  PSYCH: Nl behavior, nl affect  NEUROLOGY: AAOx3, non-focal, cranial nerves intact  SKIN: Normal color, No rashes or lesions  LINES:    Cardiovascular Diagnostic Testing:    ECG: Personally reviewed:  Normal Sinus normal ID normal QT      CXR: Personally reviewed    Labs: Personally reviewed                        8.5    8.71  )-----------( 190      ( 12 Feb 2019 14:15 )             28.8                 Hemoglobin A1C, Whole Blood: 5.2 % (02-06 @ 13:48)    Thyroid Stimulating Hormone, Serum: 1.78 uIU/mL (02-06 @ 13:48)

## 2019-02-12 NOTE — CONSULT NOTE ADULT - ATTENDING COMMENTS
Recommendation above. Subsequently, peds cardiology not in agreement.  Baby will need mri, a, and echo once delivered

## 2019-02-12 NOTE — CONSULT NOTE ADULT - SUBJECTIVE AND OBJECTIVE BOX
Pager: [phone]     HPI: 23 year old mother estimated gestational age 37-38 weeks with fetal ultrasound demonstrating vein of Priyank malformation admitted to [facility] for induction of labor. Neurosurgery consulted last week for further recommendations and management of  with VOGM.     ALLERGY  No Known Allergies    HOME MEDICATIONS:  docusate sodium 100 mg oral capsule: 1 cap(s) orally 2 times a day, As needed, Constipation (2019 15:07)  folic acid 1 mg oral tablet: 1 tab(s) orally once a day (2019 15:07)  Prenatal Multivitamins with Folic Acid 1 mg oral tablet: 1 tab(s) orally once a day (2019 15:07)    HOSPITAL MEDICATIONS:  Other:  citric acid/sodium citrate Solution 15 milliLiter(s) Oral every 4 hours  lactated ringers Bolus 1000 milliLiter(s) IV Bolus once  lactated ringers. 1000 milliLiter(s) IV Continuous <Continuous>  oxytocin Infusion 333.333 milliUNIT(s)/Min IV Continuous <Continuous>    REVIEW OF SYSTEMS:  Check here if all are normal other than Neurological [] Not assessed  General:  Eyes:  ENT:  Cardiac:  Respiratory:  GI:  Musculoskeletal:   Skin:  Neurologic:   Psychiatric:     EXAMINATION: Deferred at this time.    LABS:                        8.5    8.71  )-----------( 190      ( 2019 14:15 )             28.8       RADIOLOGY:    19 Fetal Echo: Moderately dilated right atrium. Mild to moderate right tricuspid valve regurgitation. Moderately dilated right ventricle. Trivial pericardial effusion. No pleural effusion or ascites visualized.    Full Echo results available on [employer].

## 2019-02-13 ENCOUNTER — APPOINTMENT (OUTPATIENT)
Dept: ANTEPARTUM | Facility: CLINIC | Age: 24
End: 2019-02-13

## 2019-02-13 ENCOUNTER — APPOINTMENT (OUTPATIENT)
Dept: PEDIATRIC CARDIOLOGY | Facility: CLINIC | Age: 24
End: 2019-02-13

## 2019-02-13 ENCOUNTER — RESULT REVIEW (OUTPATIENT)
Age: 24
End: 2019-02-13

## 2019-02-13 ENCOUNTER — APPOINTMENT (OUTPATIENT)
Dept: ANTEPARTUM | Facility: HOSPITAL | Age: 24
End: 2019-02-13

## 2019-02-13 PROCEDURE — 59514 CESAREAN DELIVERY ONLY: CPT | Mod: U7,GC

## 2019-02-13 PROCEDURE — 59514 CESAREAN DELIVERY ONLY: CPT | Mod: AS,U9

## 2019-02-13 PROCEDURE — 88307 TISSUE EXAM BY PATHOLOGIST: CPT | Mod: 26

## 2019-02-13 RX ORDER — OXYCODONE HYDROCHLORIDE 5 MG/1
10 TABLET ORAL
Qty: 0 | Refills: 0 | Status: DISCONTINUED | OUTPATIENT
Start: 2019-02-13 | End: 2019-02-13

## 2019-02-13 RX ORDER — SIMETHICONE 80 MG/1
80 TABLET, CHEWABLE ORAL EVERY 4 HOURS
Qty: 0 | Refills: 0 | Status: DISCONTINUED | OUTPATIENT
Start: 2019-02-13 | End: 2019-02-16

## 2019-02-13 RX ORDER — ONDANSETRON 8 MG/1
4 TABLET, FILM COATED ORAL EVERY 6 HOURS
Qty: 0 | Refills: 0 | Status: DISCONTINUED | OUTPATIENT
Start: 2019-02-13 | End: 2019-02-16

## 2019-02-13 RX ORDER — BUTORPHANOL TARTRATE 2 MG/ML
2 INJECTION, SOLUTION INTRAMUSCULAR; INTRAVENOUS ONCE
Qty: 0 | Refills: 0 | Status: DISCONTINUED | OUTPATIENT
Start: 2019-02-13 | End: 2019-02-13

## 2019-02-13 RX ORDER — DOCUSATE SODIUM 100 MG
100 CAPSULE ORAL
Qty: 0 | Refills: 0 | Status: DISCONTINUED | OUTPATIENT
Start: 2019-02-14 | End: 2019-02-16

## 2019-02-13 RX ORDER — DIPHENHYDRAMINE HCL 50 MG
25 CAPSULE ORAL EVERY 4 HOURS
Qty: 0 | Refills: 0 | Status: DISCONTINUED | OUTPATIENT
Start: 2019-02-13 | End: 2019-02-16

## 2019-02-13 RX ORDER — ACETAMINOPHEN 500 MG
975 TABLET ORAL EVERY 6 HOURS
Qty: 0 | Refills: 0 | Status: DISCONTINUED | OUTPATIENT
Start: 2019-02-13 | End: 2019-02-13

## 2019-02-13 RX ORDER — OXYTOCIN 10 UNIT/ML
41.67 VIAL (ML) INJECTION
Qty: 20 | Refills: 0 | Status: DISCONTINUED | OUTPATIENT
Start: 2019-02-13 | End: 2019-02-13

## 2019-02-13 RX ORDER — FAMOTIDINE 10 MG/ML
20 INJECTION INTRAVENOUS ONCE
Qty: 0 | Refills: 0 | Status: DISCONTINUED | OUTPATIENT
Start: 2019-02-13 | End: 2019-02-13

## 2019-02-13 RX ORDER — IBUPROFEN 200 MG
600 TABLET ORAL EVERY 6 HOURS
Qty: 0 | Refills: 0 | Status: DISCONTINUED | OUTPATIENT
Start: 2019-02-13 | End: 2019-02-13

## 2019-02-13 RX ORDER — KETOROLAC TROMETHAMINE 30 MG/ML
30 SYRINGE (ML) INJECTION EVERY 6 HOURS
Qty: 0 | Refills: 0 | Status: DISCONTINUED | OUTPATIENT
Start: 2019-02-13 | End: 2019-02-13

## 2019-02-13 RX ORDER — OXYCODONE HYDROCHLORIDE 5 MG/1
5 TABLET ORAL
Qty: 0 | Refills: 0 | Status: DISCONTINUED | OUTPATIENT
Start: 2019-02-13 | End: 2019-02-14

## 2019-02-13 RX ORDER — SODIUM CHLORIDE 9 MG/ML
1000 INJECTION, SOLUTION INTRAVENOUS
Qty: 0 | Refills: 0 | Status: DISCONTINUED | OUTPATIENT
Start: 2019-02-13 | End: 2019-02-13

## 2019-02-13 RX ORDER — LANOLIN
1 OINTMENT (GRAM) TOPICAL
Qty: 0 | Refills: 0 | Status: DISCONTINUED | OUTPATIENT
Start: 2019-02-14 | End: 2019-02-16

## 2019-02-13 RX ORDER — CITRIC ACID/SODIUM CITRATE 300-500 MG
30 SOLUTION, ORAL ORAL ONCE
Qty: 0 | Refills: 0 | Status: DISCONTINUED | OUTPATIENT
Start: 2019-02-13 | End: 2019-02-13

## 2019-02-13 RX ORDER — OXYTOCIN 10 UNIT/ML
333.33 VIAL (ML) INJECTION
Qty: 20 | Refills: 0 | Status: DISCONTINUED | OUTPATIENT
Start: 2019-02-13 | End: 2019-02-14

## 2019-02-13 RX ORDER — MORPHINE SULFATE 50 MG/1
0.1 CAPSULE, EXTENDED RELEASE ORAL ONCE
Qty: 0 | Refills: 0 | Status: DISCONTINUED | OUTPATIENT
Start: 2019-02-13 | End: 2019-02-16

## 2019-02-13 RX ORDER — NALOXONE HYDROCHLORIDE 4 MG/.1ML
0.1 SPRAY NASAL
Qty: 0 | Refills: 0 | Status: DISCONTINUED | OUTPATIENT
Start: 2019-02-13 | End: 2019-02-16

## 2019-02-13 RX ORDER — METOCLOPRAMIDE HCL 10 MG
10 TABLET ORAL ONCE
Qty: 0 | Refills: 0 | Status: DISCONTINUED | OUTPATIENT
Start: 2019-02-13 | End: 2019-02-13

## 2019-02-13 RX ORDER — HEPARIN SODIUM 5000 [USP'U]/ML
5000 INJECTION INTRAVENOUS; SUBCUTANEOUS EVERY 12 HOURS
Qty: 0 | Refills: 0 | Status: DISCONTINUED | OUTPATIENT
Start: 2019-02-13 | End: 2019-02-16

## 2019-02-13 RX ORDER — IBUPROFEN 200 MG
600 TABLET ORAL EVERY 6 HOURS
Qty: 0 | Refills: 0 | Status: DISCONTINUED | OUTPATIENT
Start: 2019-02-13 | End: 2019-02-16

## 2019-02-13 RX ORDER — FERROUS SULFATE 325(65) MG
325 TABLET ORAL DAILY
Qty: 0 | Refills: 0 | Status: DISCONTINUED | OUTPATIENT
Start: 2019-02-14 | End: 2019-02-16

## 2019-02-13 RX ORDER — ACETAMINOPHEN 500 MG
975 TABLET ORAL EVERY 6 HOURS
Qty: 0 | Refills: 0 | Status: DISCONTINUED | OUTPATIENT
Start: 2019-02-13 | End: 2019-02-16

## 2019-02-13 RX ORDER — SODIUM CHLORIDE 9 MG/ML
1000 INJECTION, SOLUTION INTRAVENOUS ONCE
Qty: 0 | Refills: 0 | Status: COMPLETED | OUTPATIENT
Start: 2019-02-13 | End: 2019-02-13

## 2019-02-13 RX ORDER — HYDROMORPHONE HYDROCHLORIDE 2 MG/ML
1 INJECTION INTRAMUSCULAR; INTRAVENOUS; SUBCUTANEOUS
Qty: 0 | Refills: 0 | Status: DISCONTINUED | OUTPATIENT
Start: 2019-02-13 | End: 2019-02-13

## 2019-02-13 RX ADMIN — SODIUM CHLORIDE 2000 MILLILITER(S): 9 INJECTION, SOLUTION INTRAVENOUS at 11:45

## 2019-02-13 RX ADMIN — SIMETHICONE 80 MILLIGRAM(S): 80 TABLET, CHEWABLE ORAL at 22:07

## 2019-02-13 RX ADMIN — Medication 600 MILLIGRAM(S): at 22:07

## 2019-02-13 RX ADMIN — BUTORPHANOL TARTRATE 2 MILLIGRAM(S): 2 INJECTION, SOLUTION INTRAMUSCULAR; INTRAVENOUS at 07:23

## 2019-02-13 RX ADMIN — HEPARIN SODIUM 5000 UNIT(S): 5000 INJECTION INTRAVENOUS; SUBCUTANEOUS at 18:30

## 2019-02-13 RX ADMIN — HYDROMORPHONE HYDROCHLORIDE 1 MILLIGRAM(S): 2 INJECTION INTRAMUSCULAR; INTRAVENOUS; SUBCUTANEOUS at 10:25

## 2019-02-13 RX ADMIN — Medication 30 MILLIGRAM(S): at 11:44

## 2019-02-13 RX ADMIN — Medication 975 MILLIGRAM(S): at 22:07

## 2019-02-13 RX ADMIN — BUTORPHANOL TARTRATE 2 MILLIGRAM(S): 2 INJECTION, SOLUTION INTRAMUSCULAR; INTRAVENOUS at 01:36

## 2019-02-13 RX ADMIN — SODIUM CHLORIDE 75 MILLILITER(S): 9 INJECTION, SOLUTION INTRAVENOUS at 11:45

## 2019-02-13 RX ADMIN — Medication 975 MILLIGRAM(S): at 22:52

## 2019-02-13 RX ADMIN — Medication 600 MILLIGRAM(S): at 22:52

## 2019-02-14 ENCOUNTER — TRANSCRIPTION ENCOUNTER (OUTPATIENT)
Age: 24
End: 2019-02-14

## 2019-02-14 DIAGNOSIS — O09.33 SUPERVISION OF PREGNANCY WITH INSUFFICIENT ANTENATAL CARE, THIRD TRIMESTER: ICD-10-CM

## 2019-02-14 DIAGNOSIS — Z3A.36 36 WEEKS GESTATION OF PREGNANCY: ICD-10-CM

## 2019-02-14 DIAGNOSIS — O28.3 ABNORMAL ULTRASONIC FINDING ON ANTENATAL SCREENING OF MOTHER: ICD-10-CM

## 2019-02-14 LAB
ALBUMIN SERPL ELPH-MCNC: 2.8 G/DL — LOW (ref 3.3–5)
ALP SERPL-CCNC: 120 U/L — SIGNIFICANT CHANGE UP (ref 40–120)
ALT FLD-CCNC: 15 U/L — SIGNIFICANT CHANGE UP (ref 4–33)
ANION GAP SERPL CALC-SCNC: 8 MMO/L — SIGNIFICANT CHANGE UP (ref 7–14)
AST SERPL-CCNC: 25 U/L — SIGNIFICANT CHANGE UP (ref 4–32)
BILIRUB SERPL-MCNC: 0.4 MG/DL — SIGNIFICANT CHANGE UP (ref 0.2–1.2)
BUN SERPL-MCNC: 5 MG/DL — LOW (ref 7–23)
CALCIUM SERPL-MCNC: 8.6 MG/DL — SIGNIFICANT CHANGE UP (ref 8.4–10.5)
CHLORIDE SERPL-SCNC: 105 MMOL/L — SIGNIFICANT CHANGE UP (ref 98–107)
CO2 SERPL-SCNC: 23 MMOL/L — SIGNIFICANT CHANGE UP (ref 22–31)
CREAT SERPL-MCNC: 0.6 MG/DL — SIGNIFICANT CHANGE UP (ref 0.5–1.3)
GLUCOSE SERPL-MCNC: 72 MG/DL — SIGNIFICANT CHANGE UP (ref 70–99)
HCT VFR BLD CALC: 26 % — LOW (ref 34.5–45)
HGB BLD-MCNC: 7.8 G/DL — LOW (ref 11.5–15.5)
MAGNESIUM SERPL-MCNC: 1.6 MG/DL — SIGNIFICANT CHANGE UP (ref 1.6–2.6)
MCHC RBC-ENTMCNC: 26.2 PG — LOW (ref 27–34)
MCHC RBC-ENTMCNC: 30 % — LOW (ref 32–36)
MCV RBC AUTO: 87.2 FL — SIGNIFICANT CHANGE UP (ref 80–100)
NRBC # FLD: 0 K/UL — LOW (ref 25–125)
PHOSPHATE SERPL-MCNC: 3.5 MG/DL — SIGNIFICANT CHANGE UP (ref 2.5–4.5)
PLATELET # BLD AUTO: 148 K/UL — LOW (ref 150–400)
PMV BLD: 12.4 FL — SIGNIFICANT CHANGE UP (ref 7–13)
POTASSIUM SERPL-MCNC: 4 MMOL/L — SIGNIFICANT CHANGE UP (ref 3.5–5.3)
POTASSIUM SERPL-SCNC: 4 MMOL/L — SIGNIFICANT CHANGE UP (ref 3.5–5.3)
PROT SERPL-MCNC: 5.5 G/DL — LOW (ref 6–8.3)
RBC # BLD: 2.98 M/UL — LOW (ref 3.8–5.2)
RBC # FLD: 16.5 % — HIGH (ref 10.3–14.5)
SODIUM SERPL-SCNC: 136 MMOL/L — SIGNIFICANT CHANGE UP (ref 135–145)
WBC # BLD: 8.17 K/UL — SIGNIFICANT CHANGE UP (ref 3.8–10.5)
WBC # FLD AUTO: 8.17 K/UL — SIGNIFICANT CHANGE UP (ref 3.8–10.5)

## 2019-02-14 RX ADMIN — Medication 600 MILLIGRAM(S): at 14:46

## 2019-02-14 RX ADMIN — SIMETHICONE 80 MILLIGRAM(S): 80 TABLET, CHEWABLE ORAL at 05:58

## 2019-02-14 RX ADMIN — Medication 600 MILLIGRAM(S): at 05:58

## 2019-02-14 RX ADMIN — Medication 975 MILLIGRAM(S): at 14:47

## 2019-02-14 RX ADMIN — Medication 600 MILLIGRAM(S): at 23:02

## 2019-02-14 RX ADMIN — HEPARIN SODIUM 5000 UNIT(S): 5000 INJECTION INTRAVENOUS; SUBCUTANEOUS at 18:36

## 2019-02-14 RX ADMIN — OXYCODONE HYDROCHLORIDE 5 MILLIGRAM(S): 5 TABLET ORAL at 09:05

## 2019-02-14 RX ADMIN — Medication 600 MILLIGRAM(S): at 15:20

## 2019-02-14 RX ADMIN — Medication 975 MILLIGRAM(S): at 15:20

## 2019-02-14 RX ADMIN — Medication 325 MILLIGRAM(S): at 14:46

## 2019-02-14 RX ADMIN — Medication 975 MILLIGRAM(S): at 23:02

## 2019-02-14 RX ADMIN — OXYCODONE HYDROCHLORIDE 5 MILLIGRAM(S): 5 TABLET ORAL at 08:20

## 2019-02-14 RX ADMIN — Medication 975 MILLIGRAM(S): at 05:58

## 2019-02-14 RX ADMIN — Medication 1 TABLET(S): at 14:46

## 2019-02-14 RX ADMIN — HEPARIN SODIUM 5000 UNIT(S): 5000 INJECTION INTRAVENOUS; SUBCUTANEOUS at 05:58

## 2019-02-14 NOTE — DISCHARGE NOTE OB - CARE PLAN
Principal Discharge DX:	 delivery delivered  Goal:	recovery  Assessment and plan of treatment:	After discharge, please stay on pelvic rest for 6 weeks, meaning no sexual intercourse, no tampons and no douching.  No driving for 2 weeks as women can loose a lot of blood during delivery and there is a possibility of being lightheaded/fainting.  No lifting objects heavier than baby for two weeks.  Expect to have vaginal bleeding/spotting for up to six weeks.  The bleeding should get lighter and more white/light brown with time.  For bleeding soaking more than a pad an hour or passing clots greater than the size of your fist, come in to the emergency department.    Follow up in clinic in 2 weeks for incision check.  Call clinic for noticeable increase in redness or swelling at incision, discharge from incision, or opening of skin at incision site.

## 2019-02-14 NOTE — DISCHARGE NOTE OB - MEDICATION SUMMARY - MEDICATIONS TO TAKE
I will START or STAY ON the medications listed below when I get home from the hospital:    acetaminophen 325 mg oral tablet  -- 3 tab(s) by mouth every 6 hours  -- Indication: For pain/cramping    ibuprofen 600 mg oral tablet  -- 1 tab(s) by mouth every 6 hours  -- Indication: For pain/cramping    oxyCODONE 5 mg oral tablet  -- 1 tab(s) by mouth every 6 hours MDD:4  -- Caution federal law prohibits the transfer of this drug to any person other  than the person for whom it was prescribed.  It is very important that you take or use this exactly as directed.  Do not skip doses or discontinue unless directed by your doctor.  May cause drowsiness.  Alcohol may intensify this effect.  Use care when operating dangerous machinery.  This prescription cannot be refilled.  Using more of this medication than prescribed may cause serious breathing problems.    -- Indication: For severe pain

## 2019-02-14 NOTE — PROGRESS NOTE ADULT - SUBJECTIVE AND OBJECTIVE BOX
Pain Service Follow-up  Postop Day  1    S/P  C- Section    T(C): 36.7 (02-14-19 @ 05:22), Max: 37.4 (02-13-19 @ 22:17)  HR: 71 (02-14-19 @ 05:22) (55 - 76)  BP: 121/78 (02-14-19 @ 05:22) (105/75 - 130/73)  RR: 17 (02-14-19 @ 05:22) (13 - 23)  SpO2: 100% (02-14-19 @ 05:22) (98% - 100%)  Wt(kg): --      THERAPY:  Spinal Morphine     Sedation Score:	  [X] Alert	      [  ] Drowsy       [  ] Arousable	[  ] Asleep         [  ] Unresponsive    Side Effects:	  [X] None	      [  ] Nausea       [  ] Pruritus        [  ] Weakness   [  ] Numbness        ASSESSMENT/ PLAN   [ X ] Discontinue         [  ] Continue    [ X ] Documentation and Verification of current medications       Satisfactory Post Anesthetic Course

## 2019-02-14 NOTE — PROGRESS NOTE ADULT - ASSESSMENT
A/P: 24yo POD#1 s/p pLTCS for NRFHT. Baby in NICU with vein of Priyank anomaly. Patient is stable and doing well post-operatively.

## 2019-02-14 NOTE — DISCHARGE NOTE OB - CARE PROVIDER_API CALL
[facility],   Oncology Building, 3rd floor  [address], [place] [address] [phone]  Phone: (   )    -  Fax: (   )    -  Follow Up Time:

## 2019-02-14 NOTE — PROGRESS NOTE ADULT - PROBLEM SELECTOR PLAN 1
- Continue regular diet.  - Increase ambulation.  - Continue motrin, tylenol, oxycodone PRN for pain control    - Patient undecided on birth control

## 2019-02-14 NOTE — DISCHARGE NOTE OB - PROVIDER TOKENS
FREE:[LAST:[[facility]],PHONE:[(   )    -],FAX:[(   )    -],ADDRESS:[[facility], 3rd floor  [address] [place] [address]  [phone]]]

## 2019-02-14 NOTE — DISCHARGE NOTE OB - ADDITIONAL INSTRUCTIONS
Please call for  follow up  postpartum visit within 2 weeks of delivery date,  at Ambulatory Clinic Unit : [facility]:  [facility], 3rd floor : phone # [phone] or walk-in hours are: MONDAY 3-6 pm, WEDNESDAY 3-6 pm, FRIDAY 9-11 am, 1-3 pm

## 2019-02-14 NOTE — PROGRESS NOTE ADULT - SUBJECTIVE AND OBJECTIVE BOX
OB Progress Note:  Delivery, POD#1    S: 22yo POD#1 s/p LTCS . Her pain is well controlled. She is tolerating a regular diet and passing flatus. Denies N/V. Denies CP/SOB/lightheadedness/dizziness. She is ambulating without difficulty.     O:   Vital Signs Last 24 Hrs  T(C): 36.7 (2019 05:22), Max: 37.4 (2019 22:17)  T(F): 98.1 (2019 05:22), Max: 99.3 (2019 22:17)  HR: 71 (2019 05:22) (55 - 76)  BP: 121/78 (2019 05:22) (105/75 - 130/73)  BP(mean): 84 (2019 14:00) (76 - 86)  RR: 17 (2019 05:22) (13 - 23)  SpO2: 100% (2019 05:22) (98% - 100%)    Labs:  Blood type: O Positive  Rubella IgG: Positive ( @ 13:48)  RPR: Negative                          7.8<L>   8.17 >-----------< 148<L>    (  @ 07:10 )             26.0<L>                        8.5<L>   8.71 >-----------< 190    (  @ 14:15 )             28.8<L>    19 @ 05:45      136  |  105  |  5<L>  ----------------------------<  72  4.0   |  23  |  0.60    19 @ 17:29      137  |  103  |  7   ----------------------------<  102<H>  3.6   |  19<L>  |  0.47<L>        Ca    8.6      2019 05:45  Ca    9.0      2019 17:29  Phos  3.5       Phos  2.7       Mg     1.6     02-14  Mg     1.7         TPro  5.5<L>  /  Alb  2.8<L>  /  TBili  0.4  /  DBili  x   /  AST  25  /  ALT  15  /  AlkPhos  120  19 @ 05:45  TPro  6.5  /  Alb  3.4  /  TBili  0.3  /  DBili  x   /  AST  18  /  ALT  11  /  AlkPhos  143<H>  19 @ 17:29          PE:  General: NAD  Abdomen: Mildly distended, appropriately tender, incision c/d/i.  Extremities: No erythema, no pitting edema

## 2019-02-14 NOTE — DISCHARGE NOTE OB - HOSPITAL COURSE
Patient had a pLTCS on 2/13/19 for NRFHT. . Hct 30-> See operative note for details. Baby was admitted to NICU intubated for vein of Priyank malformation. The patient met all appropriate post partum milestones.  The patient was able to void, tolerated a regular diet, and ambulated on her own.  The patient was discharged on POD#3 afebrile, with stable vital signs, and appropriate pain control. Patient using condoms for contraception. Patient had a pLTCS on 2/13/19 for NRFHT. . Hct 30->28.8->26. See operative note for details. Baby was admitted to NICU intubated for vein of Priyank malformation. The patient met all appropriate post partum milestones.  The patient was able to void, tolerated a regular diet, and ambulated on her own.  The patient was discharged on POD#3 afebrile, with stable vital signs, and appropriate pain control. Patient using condoms for contraception.

## 2019-02-14 NOTE — DISCHARGE NOTE OB - PLAN OF CARE
recovery After discharge, please stay on pelvic rest for 6 weeks, meaning no sexual intercourse, no tampons and no douching.  No driving for 2 weeks as women can loose a lot of blood during delivery and there is a possibility of being lightheaded/fainting.  No lifting objects heavier than baby for two weeks.  Expect to have vaginal bleeding/spotting for up to six weeks.  The bleeding should get lighter and more white/light brown with time.  For bleeding soaking more than a pad an hour or passing clots greater than the size of your fist, come in to the emergency department.    Follow up in clinic in 2 weeks for incision check.  Call clinic for noticeable increase in redness or swelling at incision, discharge from incision, or opening of skin at incision site.

## 2019-02-14 NOTE — DISCHARGE NOTE OB - PATIENT PORTAL LINK FT
You can access the [facility] Patient Portal, offered by [facility], by registering with the following website: [email or web address]

## 2019-02-15 ENCOUNTER — APPOINTMENT (OUTPATIENT)
Dept: ANTEPARTUM | Facility: CLINIC | Age: 24
End: 2019-02-15

## 2019-02-15 ENCOUNTER — APPOINTMENT (OUTPATIENT)
Dept: ANTEPARTUM | Facility: HOSPITAL | Age: 24
End: 2019-02-15

## 2019-02-15 RX ORDER — ACETAMINOPHEN 500 MG
3 TABLET ORAL
Qty: 0 | Refills: 0 | COMMUNITY
Start: 2019-02-15

## 2019-02-15 RX ORDER — IBUPROFEN 200 MG
1 TABLET ORAL
Qty: 0 | Refills: 0 | COMMUNITY
Start: 2019-02-15

## 2019-02-15 RX ORDER — OXYCODONE HYDROCHLORIDE 5 MG/1
1 TABLET ORAL
Qty: 12 | Refills: 0 | OUTPATIENT
Start: 2019-02-15

## 2019-02-15 RX ADMIN — Medication 600 MILLIGRAM(S): at 00:02

## 2019-02-15 RX ADMIN — Medication 975 MILLIGRAM(S): at 00:02

## 2019-02-15 RX ADMIN — Medication 1 TABLET(S): at 11:23

## 2019-02-15 RX ADMIN — HEPARIN SODIUM 5000 UNIT(S): 5000 INJECTION INTRAVENOUS; SUBCUTANEOUS at 06:20

## 2019-02-15 RX ADMIN — Medication 325 MILLIGRAM(S): at 11:23

## 2019-02-15 RX ADMIN — Medication 600 MILLIGRAM(S): at 11:23

## 2019-02-15 RX ADMIN — Medication 600 MILLIGRAM(S): at 13:30

## 2019-02-15 RX ADMIN — Medication 975 MILLIGRAM(S): at 11:20

## 2019-02-15 RX ADMIN — Medication 975 MILLIGRAM(S): at 13:30

## 2019-02-15 RX ADMIN — HEPARIN SODIUM 5000 UNIT(S): 5000 INJECTION INTRAVENOUS; SUBCUTANEOUS at 18:07

## 2019-02-15 NOTE — PROGRESS NOTE ADULT - PROBLEM SELECTOR PLAN 1
- Continue regular diet.  - Increase ambulation.  - Tylenol, Motrin and Oxycodone PRN for pain control.

## 2019-02-15 NOTE — PROGRESS NOTE ADULT - SUBJECTIVE AND OBJECTIVE BOX
OB Postpartum Note:  Delivery, POD#2    S: 22yo POD#2 s/p LTCS. Her pain is well controlled. She is tolerating a regular diet and passing flatus. Voiding spontaneously and ambulating without difficulty. Denies N/V. Denies CP/SOB/lightheadedness/dizziness.     O:   Vitals:  Vital Signs Last 24 Hrs  T(C): 36.9 (15 Feb 2019 05:35), Max: 37 (2019 14:15)  T(F): 98.4 (15 Feb 2019 05:35), Max: 98.6 (2019 14:15)  HR: 72 (15 Feb 2019 05:35) (71 - 75)  BP: 111/70 (15 Feb 2019 05:35) (111/70 - 126/75)  BP(mean): --  RR: 18 (15 Feb 2019 05:35) (18 - 18)  SpO2: 100% (15 Feb 2019 05:35) (99% - 100%)    MEDICATIONS  (STANDING):  acetaminophen   Tablet .. 975 milliGRAM(s) Oral every 6 hours  ferrous    sulfate 325 milliGRAM(s) Oral daily  heparin  Injectable 5000 Unit(s) SubCutaneous every 12 hours  ibuprofen  Tablet. 600 milliGRAM(s) Oral every 6 hours  morphine PF Spinal 0.1 milliGRAM(s) IntraThecal. once  prenatal multivitamin 1 Tablet(s) Oral daily    MEDICATIONS  (PRN):  diphenhydrAMINE   Injectable 25 milliGRAM(s) IV Push every 4 hours PRN Pruritus  docusate sodium 100 milliGRAM(s) Oral two times a day PRN Stool Softening  lanolin Ointment 1 Application(s) Topical every 3 hours PRN Sore Nipples  naloxone Injectable 0.1 milliGRAM(s) IV Push every 3 minutes PRN For ANY of the following changes in patient status:  A. RR LESS THAN 10 breaths per minute, B. Oxygen saturation LESS THAN 90%, C. Sedation score of 6  ondansetron Injectable 4 milliGRAM(s) IV Push every 6 hours PRN Nausea  simethicone 80 milliGRAM(s) Chew every 4 hours PRN Gas      Labs:  Blood type: O Positive  Rubella IgG: Positive ( @ 13:48)  RPR: Negative                          7.8<L>   8.17 >-----------< 148<L>    (  @ 07:10 )             26.0<L>                        8.5<L>   8.71 >-----------< 190    (  @ 14:15 )             28.8<L>    19 @ 05:45      136  |  105  |  5<L>  ----------------------------<  72  4.0   |  23  |  0.60    19 @ 17:29      137  |  103  |  7   ----------------------------<  102<H>  3.6   |  19<L>  |  0.47<L>        Ca    8.6      2019 05:45  Ca    9.0      2019 17:29  Phos  3.5       Phos  2.7       Mg     1.6       Mg     1.7         TPro  5.5<L>  /  Alb  2.8<L>  /  TBili  0.4  /  DBili  x   /  AST  25  /  ALT  15  /  AlkPhos  120  19 @ 05:45  TPro  6.5  /  Alb  3.4  /  TBili  0.3  /  DBili  x   /  AST  18  /  ALT  11  /  AlkPhos  143<H>  19 @ 17:29          PE:  General: NAD  Abdomen: mildly distended,appropriately tender, incision c/d/i.  Extremities: SCDs in place, no erythema

## 2019-02-15 NOTE — LACTATION INITIAL EVALUATION - INTERVENTION OUTCOME
Reviewed use of double electric breastpump, including duration and frequency of pumping sessions. Safe collection, handling and storage of breastmilk reviewed. Proper cleansing of breastpump parts discussed.

## 2019-02-16 VITALS
SYSTOLIC BLOOD PRESSURE: 112 MMHG | OXYGEN SATURATION: 100 % | TEMPERATURE: 99 F | DIASTOLIC BLOOD PRESSURE: 71 MMHG | HEART RATE: 72 BPM | RESPIRATION RATE: 18 BRPM

## 2019-02-16 RX ADMIN — Medication 325 MILLIGRAM(S): at 13:11

## 2019-02-16 RX ADMIN — Medication 600 MILLIGRAM(S): at 06:05

## 2019-02-16 RX ADMIN — Medication 600 MILLIGRAM(S): at 07:01

## 2019-02-16 RX ADMIN — Medication 975 MILLIGRAM(S): at 06:05

## 2019-02-16 RX ADMIN — Medication 975 MILLIGRAM(S): at 07:01

## 2019-02-16 RX ADMIN — Medication 1 TABLET(S): at 13:11

## 2019-02-16 RX ADMIN — Medication 100 MILLIGRAM(S): at 10:43

## 2019-02-16 RX ADMIN — HEPARIN SODIUM 5000 UNIT(S): 5000 INJECTION INTRAVENOUS; SUBCUTANEOUS at 06:02

## 2019-02-16 NOTE — PROGRESS NOTE ADULT - SUBJECTIVE AND OBJECTIVE BOX
OB Postpartum Note:  Delivery, POD#3    S: 22yo POD#3 s/p LTCS. The patient feels well.  Pain is well controlled. She is tolerating a regular diet and passing flatus. She is voiding spontaneously, and ambulating without difficulty. Denies CP/SOB. Denies lightheadedness/dizziness. Denies N/V.    O:  Vitals:  Vital Signs Last 24 Hrs  T(C): 37 (2019 05:20), Max: 37.2 (15 Feb 2019 21:55)  T(F): 98.6 (2019 05:20), Max: 98.9 (15 Feb 2019 21:55)  HR: 72 (2019 05:20) (69 - 80)  BP: 112/71 (2019 05:20) (112/71 - 124/70)  BP(mean): --  RR: 18 (2019 05:20) (18 - 20)  SpO2: 100% (2019 05:20) (100% - 100%)    MEDICATIONS  (STANDING):  acetaminophen   Tablet .. 975 milliGRAM(s) Oral every 6 hours  ferrous    sulfate 325 milliGRAM(s) Oral daily  heparin  Injectable 5000 Unit(s) SubCutaneous every 12 hours  ibuprofen  Tablet. 600 milliGRAM(s) Oral every 6 hours  morphine PF Spinal 0.1 milliGRAM(s) IntraThecal. once  prenatal multivitamin 1 Tablet(s) Oral daily    MEDICATIONS  (PRN):  diphenhydrAMINE   Injectable 25 milliGRAM(s) IV Push every 4 hours PRN Pruritus  docusate sodium 100 milliGRAM(s) Oral two times a day PRN Stool Softening  lanolin Ointment 1 Application(s) Topical every 3 hours PRN Sore Nipples  naloxone Injectable 0.1 milliGRAM(s) IV Push every 3 minutes PRN For ANY of the following changes in patient status:  A. RR LESS THAN 10 breaths per minute, B. Oxygen saturation LESS THAN 90%, C. Sedation score of 6  ondansetron Injectable 4 milliGRAM(s) IV Push every 6 hours PRN Nausea  simethicone 80 milliGRAM(s) Chew every 4 hours PRN Gas      LABS:  Blood type: O Positive  Rubella IgG: Positive ( @ 13:48)  RPR: Negative                          7.8<L>   8.17 >-----------< 148<L>    (  @ 07:10 )             26.0<L>    19 @ 05:45      136  |  105  |  5<L>  ----------------------------<  72  4.0   |  23  |  0.60        Ca    8.6      2019 05:45  Phos  3.5       Mg     1.6         TPro  5.5<L>  /  Alb  2.8<L>  /  TBili  0.4  /  DBili  x   /  AST  25  /  ALT  15  /  AlkPhos  120  19 @ 05:45          Physical exam:  Gen: NAD  Abdomen: Soft, nontender, no distension , firm uterine fundus at umbilicus.  Incision: Clean, dry, and intact   Pelvic: Normal lochia noted  Ext: No calf tenderness

## 2019-02-16 NOTE — PROGRESS NOTE ADULT - PROBLEM SELECTOR PLAN 1
- Continue motrin, tylenol, oxycodone PRN for pain control.  - Increase ambulation  - Continue regular diet  - Discharge planning, home today  - Appreciate social work input for Visa expiration  - Appreciate NICU social input for coordination of Enrique Jones, PGY1  Pager #[phone]

## 2019-02-19 ENCOUNTER — APPOINTMENT (OUTPATIENT)
Dept: OBGYN | Facility: HOSPITAL | Age: 24
End: 2019-02-19

## 2019-02-19 ENCOUNTER — NON-APPOINTMENT (OUTPATIENT)
Age: 24
End: 2019-02-19

## 2019-02-19 LAB — SURGICAL PATHOLOGY STUDY: SIGNIFICANT CHANGE UP

## 2019-02-25 ENCOUNTER — APPOINTMENT (OUTPATIENT)
Dept: OBGYN | Facility: HOSPITAL | Age: 24
End: 2019-02-25

## 2019-02-25 DIAGNOSIS — O28.3 ABNORMAL ULTRASONIC FINDING ON ANTENATAL SCREENING OF MOTHER: ICD-10-CM

## 2019-02-25 DIAGNOSIS — O09.33 SUPERVISION OF PREGNANCY WITH INSUFFICIENT ANTENATAL CARE, THIRD TRIMESTER: ICD-10-CM

## 2019-02-25 DIAGNOSIS — Z3A.36 36 WEEKS GESTATION OF PREGNANCY: ICD-10-CM

## 2019-02-28 ENCOUNTER — APPOINTMENT (OUTPATIENT)
Dept: OBGYN | Facility: HOSPITAL | Age: 24
End: 2019-02-28

## 2019-02-28 ENCOUNTER — CHART COPY (OUTPATIENT)
Age: 24
End: 2019-02-28

## 2019-02-28 ENCOUNTER — NON-APPOINTMENT (OUTPATIENT)
Age: 24
End: 2019-02-28

## 2019-02-28 ENCOUNTER — LABORATORY RESULT (OUTPATIENT)
Age: 24
End: 2019-02-28

## 2019-02-28 ENCOUNTER — OUTPATIENT (OUTPATIENT)
Dept: OUTPATIENT SERVICES | Facility: HOSPITAL | Age: 24
LOS: 1 days | End: 2019-02-28

## 2019-02-28 VITALS
SYSTOLIC BLOOD PRESSURE: 121 MMHG | WEIGHT: 153 LBS | HEIGHT: 60 IN | DIASTOLIC BLOOD PRESSURE: 76 MMHG | HEART RATE: 60 BPM | BODY MASS INDEX: 30.04 KG/M2

## 2019-02-28 DIAGNOSIS — Z98.891 HISTORY OF UTERINE SCAR FROM PREVIOUS SURGERY: ICD-10-CM

## 2019-02-28 DIAGNOSIS — O35.8XX0 MATERNAL CARE FOR OTHER (SUSPECTED) FETAL ABNORMALITY AND DAMAGE, NOT APPLICABLE OR UNSPECIFIED: ICD-10-CM

## 2019-02-28 DIAGNOSIS — D64.9 ANEMIA, UNSPECIFIED: ICD-10-CM

## 2019-02-28 DIAGNOSIS — O09.93 SUPERVISION OF HIGH RISK PREGNANCY, UNSPECIFIED, THIRD TRIMESTER: ICD-10-CM

## 2019-02-28 LAB
BASOPHILS # BLD AUTO: 0.03 K/UL — SIGNIFICANT CHANGE UP (ref 0–0.2)
BASOPHILS NFR BLD AUTO: 0.5 % — SIGNIFICANT CHANGE UP (ref 0–2)
EOSINOPHIL # BLD AUTO: 0.1 K/UL — SIGNIFICANT CHANGE UP (ref 0–0.5)
EOSINOPHIL NFR BLD AUTO: 1.5 % — SIGNIFICANT CHANGE UP (ref 0–6)
HCT VFR BLD CALC: 34.9 % — SIGNIFICANT CHANGE UP (ref 34.5–45)
HGB BLD-MCNC: 9.8 G/DL — LOW (ref 11.5–15.5)
IMM GRANULOCYTES NFR BLD AUTO: 0.5 % — SIGNIFICANT CHANGE UP (ref 0–1.5)
LYMPHOCYTES # BLD AUTO: 1.56 K/UL — SIGNIFICANT CHANGE UP (ref 1–3.3)
LYMPHOCYTES # BLD AUTO: 24.1 % — SIGNIFICANT CHANGE UP (ref 13–44)
MCHC RBC-ENTMCNC: 24.7 PG — LOW (ref 27–34)
MCHC RBC-ENTMCNC: 28.1 % — LOW (ref 32–36)
MCV RBC AUTO: 87.9 FL — SIGNIFICANT CHANGE UP (ref 80–100)
MONOCYTES # BLD AUTO: 0.29 K/UL — SIGNIFICANT CHANGE UP (ref 0–0.9)
MONOCYTES NFR BLD AUTO: 4.5 % — SIGNIFICANT CHANGE UP (ref 2–14)
NEUTROPHILS # BLD AUTO: 4.47 K/UL — SIGNIFICANT CHANGE UP (ref 1.8–7.4)
NEUTROPHILS NFR BLD AUTO: 68.9 % — SIGNIFICANT CHANGE UP (ref 43–77)
NRBC # FLD: 0 K/UL — LOW (ref 25–125)
PLATELET # BLD AUTO: 312 K/UL — SIGNIFICANT CHANGE UP (ref 150–400)
PMV BLD: 10.3 FL — SIGNIFICANT CHANGE UP (ref 7–13)
RBC # BLD: 3.97 M/UL — SIGNIFICANT CHANGE UP (ref 3.8–5.2)
RBC # FLD: 16 % — HIGH (ref 10.3–14.5)
WBC # BLD: 6.48 K/UL — SIGNIFICANT CHANGE UP (ref 3.8–10.5)
WBC # FLD AUTO: 6.48 K/UL — SIGNIFICANT CHANGE UP (ref 3.8–10.5)

## 2019-02-28 NOTE — HISTORY OF PRESENT ILLNESS
[Complications:___] : complications include: [unfilled] [Last Pap Date: ___] : Last Pap Date: [unfilled] [Male] : Delivery History: baby boy [Discharge HCT: ___] : hematocrit level was [unfilled] [Discharge HGB: ___] : hemoglobin level was [unfilled] [Intended Contraception] : Intended Contraception: [Condoms] : condoms [None] : No associated symptoms are reported [Clean/Dry/Intact] : clean, dry and intact [Delivery Date: ___] : on [unfilled] [Doing Well] : is doing well [Rhogam] : Rhogam was not administered [Rubella Vaccine] : Rubella vaccine was not administered [Pertussis Vaccine] : Pertussis vaccine was not administered [BTL] : no tubal ligation [Breastfeeding] : not currently nursing [Resumed Menses] : has not resumed her menses [Resumed [place]] : has not resumed intercourse [S/Sx PP Depression] : no signs/symptoms of postpartum depression [FreeTextEntry8] : Here for wound check.  2/3/19 at 37 weeks - demise secondary to multiple fetal anomalies [de-identified] : Keep incision clean and dry. No heavy lifting. Continue with Iron1 tab po once daily. Had demise at 37 weeks due to multiple fetal anomalies. CBC today. will use condoms for birth control. Referred to - lonny but coping. Aware of for moms program.RTC 4 weeks. MHegarty NP

## 2019-03-01 LAB
BACTERIA UR CULT: SIGNIFICANT CHANGE UP
SPECIMEN SOURCE: SIGNIFICANT CHANGE UP

## 2019-03-14 ENCOUNTER — INPATIENT (INPATIENT)
Facility: HOSPITAL | Age: 24
LOS: 1 days | Discharge: HOME | End: 2019-03-16
Attending: HOSPITALIST | Admitting: HOSPITALIST
Payer: MEDICAID

## 2019-03-14 VITALS
OXYGEN SATURATION: 100 % | HEART RATE: 67 BPM | RESPIRATION RATE: 16 BRPM | SYSTOLIC BLOOD PRESSURE: 125 MMHG | DIASTOLIC BLOOD PRESSURE: 66 MMHG | TEMPERATURE: 98 F

## 2019-03-14 DIAGNOSIS — R10.9 UNSPECIFIED ABDOMINAL PAIN: ICD-10-CM

## 2019-03-14 DIAGNOSIS — R06.00 DYSPNEA, UNSPECIFIED: ICD-10-CM

## 2019-03-14 DIAGNOSIS — Z98.891 HISTORY OF UTERINE SCAR FROM PREVIOUS SURGERY: Chronic | ICD-10-CM

## 2019-03-14 LAB
ALBUMIN SERPL ELPH-MCNC: 4.2 G/DL — SIGNIFICANT CHANGE UP (ref 3.3–5)
ALP SERPL-CCNC: 87 U/L — SIGNIFICANT CHANGE UP (ref 40–120)
ALT FLD-CCNC: 17 U/L — SIGNIFICANT CHANGE UP (ref 4–33)
ANION GAP SERPL CALC-SCNC: 12 MMO/L — SIGNIFICANT CHANGE UP (ref 7–14)
AST SERPL-CCNC: 20 U/L — SIGNIFICANT CHANGE UP (ref 4–32)
BASOPHILS # BLD AUTO: 0.02 K/UL — SIGNIFICANT CHANGE UP (ref 0–0.2)
BASOPHILS NFR BLD AUTO: 0.5 % — SIGNIFICANT CHANGE UP (ref 0–2)
BILIRUB SERPL-MCNC: < 0.2 MG/DL — LOW (ref 0.2–1.2)
BUN SERPL-MCNC: 8 MG/DL — SIGNIFICANT CHANGE UP (ref 7–23)
CALCIUM SERPL-MCNC: 10.2 MG/DL — SIGNIFICANT CHANGE UP (ref 8.4–10.5)
CHLORIDE SERPL-SCNC: 107 MMOL/L — SIGNIFICANT CHANGE UP (ref 98–107)
CO2 SERPL-SCNC: 23 MMOL/L — SIGNIFICANT CHANGE UP (ref 22–31)
CREAT SERPL-MCNC: 0.58 MG/DL — SIGNIFICANT CHANGE UP (ref 0.5–1.3)
D DIMER BLD IA.RAPID-MCNC: 2378 NG/ML — SIGNIFICANT CHANGE UP
EOSINOPHIL # BLD AUTO: 0.13 K/UL — SIGNIFICANT CHANGE UP (ref 0–0.5)
EOSINOPHIL NFR BLD AUTO: 3.1 % — SIGNIFICANT CHANGE UP (ref 0–6)
GLUCOSE SERPL-MCNC: 90 MG/DL — SIGNIFICANT CHANGE UP (ref 70–99)
HCT VFR BLD CALC: 36.3 % — SIGNIFICANT CHANGE UP (ref 34.5–45)
HGB BLD-MCNC: 10.5 G/DL — LOW (ref 11.5–15.5)
IMM GRANULOCYTES NFR BLD AUTO: 0.2 % — SIGNIFICANT CHANGE UP (ref 0–1.5)
INR BLD: 1.74 — HIGH (ref 0.88–1.17)
LYMPHOCYTES # BLD AUTO: 1.68 K/UL — SIGNIFICANT CHANGE UP (ref 1–3.3)
LYMPHOCYTES # BLD AUTO: 40.3 % — SIGNIFICANT CHANGE UP (ref 13–44)
MCHC RBC-ENTMCNC: 24.9 PG — LOW (ref 27–34)
MCHC RBC-ENTMCNC: 28.9 % — LOW (ref 32–36)
MCV RBC AUTO: 86 FL — SIGNIFICANT CHANGE UP (ref 80–100)
MONOCYTES # BLD AUTO: 0.25 K/UL — SIGNIFICANT CHANGE UP (ref 0–0.9)
MONOCYTES NFR BLD AUTO: 6 % — SIGNIFICANT CHANGE UP (ref 2–14)
NEUTROPHILS # BLD AUTO: 2.08 K/UL — SIGNIFICANT CHANGE UP (ref 1.8–7.4)
NEUTROPHILS NFR BLD AUTO: 49.9 % — SIGNIFICANT CHANGE UP (ref 43–77)
NRBC # FLD: 0 K/UL — LOW (ref 25–125)
NT-PROBNP SERPL-SCNC: 29.56 PG/ML — SIGNIFICANT CHANGE UP
PLATELET # BLD AUTO: 267 K/UL — SIGNIFICANT CHANGE UP (ref 150–400)
PMV BLD: 10.5 FL — SIGNIFICANT CHANGE UP (ref 7–13)
POTASSIUM SERPL-MCNC: 4.1 MMOL/L — SIGNIFICANT CHANGE UP (ref 3.5–5.3)
POTASSIUM SERPL-SCNC: 4.1 MMOL/L — SIGNIFICANT CHANGE UP (ref 3.5–5.3)
PROT SERPL-MCNC: 7.5 G/DL — SIGNIFICANT CHANGE UP (ref 6–8.3)
PROTHROM AB SERPL-ACNC: 19.7 SEC — HIGH (ref 9.8–13.1)
RBC # BLD: 4.22 M/UL — SIGNIFICANT CHANGE UP (ref 3.8–5.2)
RBC # FLD: 16 % — HIGH (ref 10.3–14.5)
SODIUM SERPL-SCNC: 142 MMOL/L — SIGNIFICANT CHANGE UP (ref 135–145)
TROPONIN T, HIGH SENSITIVITY: 13 NG/L — SIGNIFICANT CHANGE UP (ref ?–14)
TROPONIN T, HIGH SENSITIVITY: < 6 NG/L — SIGNIFICANT CHANGE UP (ref ?–14)
WBC # BLD: 4.17 K/UL — SIGNIFICANT CHANGE UP (ref 3.8–10.5)
WBC # FLD AUTO: 4.17 K/UL — SIGNIFICANT CHANGE UP (ref 3.8–10.5)

## 2019-03-14 PROCEDURE — 93970 EXTREMITY STUDY: CPT | Mod: 26

## 2019-03-14 PROCEDURE — 71275 CT ANGIOGRAPHY CHEST: CPT | Mod: 26

## 2019-03-14 PROCEDURE — 71046 X-RAY EXAM CHEST 2 VIEWS: CPT | Mod: 26

## 2019-03-14 PROCEDURE — 74177 CT ABD & PELVIS W/CONTRAST: CPT | Mod: 26

## 2019-03-14 PROCEDURE — 99224: CPT

## 2019-03-14 RX ORDER — RIVAROXABAN 15 MG-20MG
15 KIT ORAL ONCE
Qty: 0 | Refills: 0 | Status: COMPLETED | OUTPATIENT
Start: 2019-03-14 | End: 2019-03-14

## 2019-03-14 RX ORDER — SODIUM CHLORIDE 9 MG/ML
3 INJECTION INTRAMUSCULAR; INTRAVENOUS; SUBCUTANEOUS ONCE
Qty: 0 | Refills: 0 | Status: COMPLETED | OUTPATIENT
Start: 2019-03-14 | End: 2019-03-14

## 2019-03-14 RX ORDER — RIVAROXABAN 15 MG-20MG
15 KIT ORAL
Qty: 0 | Refills: 0 | Status: DISCONTINUED | OUTPATIENT
Start: 2019-03-14 | End: 2019-03-16

## 2019-03-14 RX ADMIN — RIVAROXABAN 15 MILLIGRAM(S): KIT at 17:27

## 2019-03-14 RX ADMIN — SODIUM CHLORIDE 3 MILLILITER(S): 9 INJECTION INTRAMUSCULAR; INTRAVENOUS; SUBCUTANEOUS at 10:00

## 2019-03-14 NOTE — ED PROVIDER NOTE - CLINICAL SUMMARY MEDICAL DECISION MAKING FREE TEXT BOX
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

## 2019-03-14 NOTE — CONSULT NOTE ADULT - ASSESSMENT
23 y/o female 4 weeks s/p  c/o progressive SOB over the last 2 weeks, especially on exertion.  Currently, denies chest pain, N.V.D, cough, fever. Denies using any meds, "street" drugs too,  now with bradycardia     In the ER, CTA negative for large PE, + for thrombosis of the right gonadal vein, given Xarelto 15 mg     Bradycardia   - unlikely ACS or cardiomyopathy   - possible high vagal tone, unclear   - TTE in the am   - Avoid all AV alexander blockers  - Ambulate patient in morning on tele.  Monitor for SOB and increase HR  - Will f/u in the am

## 2019-03-14 NOTE — ED PROVIDER NOTE - OBJECTIVE STATEMENT
pt 4 weeks s/p  c/o progressive SOB over the last 2 weeks, especially on exertion, pt reports at one point almost felt like she was going to pass out.  denies any cough, fever or chest pain.

## 2019-03-14 NOTE — CONSULT NOTE ADULT - ATTENDING COMMENTS
Late entry - patient seen by me yesterday at about 3 PM. Agree with above assessment and plan. As above, patient is a 23 year old  recently postpartum/postop from primary  section, presenting with shortness of breath. 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 Would recommend anticoagulation, as patient symptomatic. Duration of anticoagulation to be determined by hematology.   Recommend r/o peripartum cardiomyopathy, as CTPA negative for PE. Also recommend lower extremity dopplers to r/o DVT, as patient is complaining of calf pain.

## 2019-03-14 NOTE — CONSULT NOTE ADULT - SUBJECTIVE AND OBJECTIVE BOX
Date of Admission: 3/14/19    CHIEF COMPLAINT: SOB x2 weeks     HISTORY OF PRESENT ILLNESS:  23 y/o female  4 weeks s/p  c/o progressive SOB over the last 2 weeks, especially on exertion.  Currently, denies chest pain, N.V.D, cough, fever. Denies using any meds, "street" drugs too,  now with bradycardia     In the ER, CTA negative for large PE, + for thrombosis of the right gonadal vein, given Xarelto 15 mg       Allergies    No Known Allergies    Intolerances    	    MEDICATIONS:  rivaroxaban 15 milliGRAM(s) Oral two times a day      PAST MEDICAL & SURGICAL HISTORY:  No pertinent past medical history  S/P       FAMILY HISTORY:  Family history of diabetes mellitus (DM) (Grandparent)      SOCIAL HISTORY:    [x] Non-smoker  [ ] Smoker  [x] Social Alcohol      REVIEW OF SYSTEMS:  CONSTITUTIONAL: No fever, weight loss, or fatigue  RESPIRATORY: No cough, wheezing, chills or hemoptysis; + Shortness of Breath  CARDIOVASCULAR: No chest pain, palpitations, passing out, dizziness, or leg swelling  GASTROINTESTINAL: No abdominal or epigastric pain. No nausea, vomiting, or hematemesis; No diarrhea or constipation. No melena or hematochezia.  GENITOURINARY: No dysuria, frequency, hematuria, or incontinence  NEUROLOGICAL: No headaches, memory loss, loss of strength, numbness, or tremors  SKIN: No itching, burning, rashes, or lesions   MUSCULOSKELETAL: No joint pain or swelling; No muscle, back, or extremity pain  PSYCHIATRIC: No depression, anxiety, mood swings, or difficulty sleeping  	    [ ] All others negative	  [ ] Unable to obtain    PHYSICAL EXAM:  T(C): 36.8 (19 @ 17:22), Max: 36.8 (19 @ 08:19)  HR: 56 (19 @ 17:22) (56 - 67)  BP: 124/74 (19 @ 17:22) (124/74 - 125/66)  RR: 18 (19 @ 17:22) (16 - 18)  SpO2: 100% (19 @ 17:22) (100% - 100%)  I&O's Summary      Appearance: Normal	  HEENT:   Normal oral mucosa	  Cardiovascular: Normal S1 S2, No JVD, No murmurs, No edema  Respiratory: Lungs clear to auscultation	  Psychiatry: A & O x 3, Mood & affect appropriate  Gastrointestinal:  Soft, Non-tender, + BS	  Skin: No rashes, No ecchymoses, No cyanosis	  Neurologic: Non-focal  Extremities: Normal range of motion, No clubbing, cyanosis or edema  Vascular: Peripheral pulses palpable 2+ bilaterally        LABS:	 	          142  |  107  |  8   ----------------------------<  90  4.1   |  23  |  0.58    Ca    10.2      14 Mar 2019 09:40    TPro  7.5  /  Alb  4.2  /  TBili  < 0.2<L>  /  DBili  x   /  AST  20  /  ALT  17  /  AlkPhos  87        proBNP: Serum Pro-Brain Natriuretic Peptide: 29.56 pg/mL ( @ 09:40)      TSH: 1.78      CARDIAC MARKERS:  HST 13, <6          TELEMETRY: Episodes JR	    ECG:  Sinus Bradycardia 	  	    PREVIOUS DIAGNOSTIC TESTING:      CT Angio Chest w/ IV Cont (19 @ 11:47)  INTERPRETATION:  Clinical indications: Shortness of breath, right lower   quadrant pain, status post .    CT pulmonary angiogram was performed following intravenous administration   of 90 cc of Omnipaque 350. 10 cc of contrast was discarded. MIP images   are submitted. CT of the abdomen and pelvis was also performed.    This study is limited for evaluation of some of the segmental and   subsegmental pulmonary arterial branches due to poor contrast   opacification respiratory motion artifact. No pulmonary arterial emboli   are identified.    There are no enlarged bilateral axillary, mediastinal or hilar lymph   nodes. There is no pericardial effusion. There is a trace left pleural   effusion.    Evaluation of the lungs demonstrate no pneumonia. There are no lung   nodules. There are no central endobronchial lesions.    The liver, gallbladder, spleen, pancreas, adrenal glands, kidneys,   urinary bladder are unremarkable. The uterus and the adnexa are   unremarkable given the recent . Trace free fluid is noted within   the pelvis. There is no bowel obstruction or free intraperitoneal air.   There is a flat-shaped small amount of fluid between the uterus and the   bladder representing a bladder flap hematoma. The appendix is normal.   There is thrombosis of the right gonadal vein and one of its sidebranches.    Postoperative changes are seen along the lower anterior abdominal wall.    Evaluation of the bones demonstrate no significant abnormality.    IMPRESSION: No pulmonary arterial emboli as above.    Thrombosis of the right gonadal vein.    Small amount of fluid between the bladder and the uterus representing   small amount of bladder flap hematoma, postoperative in etiology.    Findings discussed with Dr. Murray on 2019 at 12:51 PM with read   back.

## 2019-03-14 NOTE — ED CDU PROVIDER INITIAL DAY NOTE - DETAILS
Shortness of breath/R sided abdominal pain dx with thrombosis of right gonadal vein s/p , placed in CDU for echo, heme consult, gyn following, anticoagulations, LE Doppler.

## 2019-03-14 NOTE — CONSULT NOTE ADULT - SUBJECTIVE AND OBJECTIVE BOX
IVNCENT PATTON  23y  Female [id number]    HPI:        Name of GYN Physician:     POB:      Pgyn: Denies fibroids, cysts, endometriosis, STI's, Abnormal pap smears     Home meds:     Hospital Meds:   MEDICATIONS  (STANDING):  rivaroxaban 15 milliGRAM(s) Oral once    MEDICATIONS  (PRN):      Allergies    No Known Allergies    Intolerances        PAST MEDICAL & SURGICAL HISTORY:  No pertinent past medical history  No significant past surgical history      FAMILY HISTORY:      Social History:  Denies smoking use, drug use, alcohol use.   +occasional social alcohol use    Vital Signs Last 24 Hrs  T(C): 36.8 (14 Mar 2019 08:19), Max: 36.8 (14 Mar 2019 08:19)  T(F): 98.3 (14 Mar 2019 08:19), Max: 98.3 (14 Mar 2019 08:19)  HR: 67 (14 Mar 2019 08:19) (67 - 67)  BP: 125/66 (14 Mar 2019 08:19) (125/66 - 125/66)  BP(mean): --  RR: 16 (14 Mar 2019 08:19) (16 - 16)  SpO2: 100% (14 Mar 2019 08:19) (100% - 100%)    Physical Exam:   General: sitting comftorably in bed, NAD   HEENT: neck supple, full ROM  CV: RR S1S2 no m/r/g  Lungs: CTA b/l, good air flow b/l   Back: No CVA tenderness  Abd: Soft, non-tender, non-distended.  Bowel sounds present.    :  No bleeding on pad.    External labia wnl.  Bimanual exam with no cervical motion tenderness, uterus wnl, adnexa non palpable b/l.  Cervix closed vs. Cervix dilated    cm   Speculum Exam: No active bleeding from os.  Physiologic discharge.    Ext: non-tender b/l, no edema     LABS:                              10.5   4.17  )-----------( 267      ( 14 Mar 2019 09:40 )             36.3     03-14    142  |  107  |  8   ----------------------------<  90  4.1   |  23  |  0.58    Ca    10.2      14 Mar 2019 09:40    TPro  7.5  /  Alb  4.2  /  TBili  < 0.2<L>  /  DBili  x   /  AST  20  /  ALT  17  /  AlkPhos  87  03-14    I&O's Detail          RADIOLOGY & ADDITIONAL STUDIES: VINCENT PATTON  23y  Female [id number]    HPI:  22 y/o , 4 weeks postpartum status post primary  19 [facility], presents to the ED c/o worsening SOB x 2 weeks and RLQ pain x 3 weeks. Patient reports dyspnea on exertion, both when walking up the stairs and when ambulating on level ground. Denies associated palpitations, chest pain. No changes in SOB upon lying flat. RLQ pain has been intermittent.  Denies heavy vaginal bleeding. She did not try anything OTC to relieve the pain. RLQ pain associated with RLE discomfort. RLE is not swollen. Denies skin changes.    Name of GYN Physician: Previously seen at [facility] but referred to [facility] for 3rd trimester care due to known fetal anomalies.    POB:  s/p primary  @ 37+ wks GA for [facility] 19, pregnancy complciated by known fetal CNS anomalies and subsequent  death    Pgyn: Denies fibroids, cysts, endometriosis, STIs, abnormal pap smears     Home meds: none    Hospital Meds:   MEDICATIONS  (STANDING): rivaroxaban 15 milliGRAM(s) Oral once  MEDICATIONS  (PRN):    No Known Allergies or intolerances    PAST MEDICAL & SURGICAL HISTORY:  No pertinent past medical history  No significant past surgical history    FAMILY HISTORY: none    Social History:  Denies smoking use, drug use, alcohol use.      Vital Signs Last 24 Hrs  T(C): 36.8 (14 Mar 2019 08:19), Max: 36.8 (14 Mar 2019 08:19)  T(F): 98.3 (14 Mar 2019 08:19), Max: 98.3 (14 Mar 2019 08:19)  HR: 67 (14 Mar 2019 08:19) (67 - 67)  BP: 125/66 (14 Mar 2019 08:19) (125/66 - 125/66)  BP(mean): --  RR: 16 (14 Mar 2019 08:19) (16 - 16)  SpO2: 100% (14 Mar 2019 08:19) (100% - 100%)    Physical Exam:   General: sitting up comfortably, NAD   CV: RR S1S2 no m/r/g  Lungs: CTA b/l  Abd: Soft, mildly tender in RLQ, non-distended, no rigidity or guarding    Pelvic exam: deferred  Ext: LE non-tender b/l, no edema of erythema of b/l LE    LABS:                        10.5   4.17  )-----------( 267      ( 14 Mar 2019 09:40 )             36.3         142  |  107  |  8   ----------------------------<  90  4.1   |  23  |  0.58    Ca    10.2      14 Mar 2019 09:40    TPro  7.5  /  Alb  4.2  /  TBili  < 0.2<L>  /  DBili  x   /  AST  20  /  ALT  17  /  AlkPhos  87      I&O's Detail      RADIOLOGY & ADDITIONAL STUDIES:    EXAM:  CT ABDOMEN AND PELVIS IC    EXAM:  CT ANGIO CHEST (W)AW IC      PROCEDURE DATE:  Mar 14 2019     INTERPRETATION:  Clinical indications: Shortness of breath, right lower   quadrant pain, status post .    CT pulmonary angiogram was performed following intravenous administration   of 90 cc of Omnipaque 350. 10 cc of contrast was discarded. MIP images   are submitted. CT of the abdomen and pelvis was also performed.    This study is limited for evaluation of some of the segmental and   subsegmental pulmonary arterial branches due to poor contrast   opacification respiratory motion artifact. No pulmonary arterial emboli   are identified.    There are no enlarged bilateral axillary, mediastinal or hilar lymph   nodes. There is no pericardial effusion. There is a trace left pleural   effusion.    Evaluation of the lungs demonstrate no pneumonia. There are no lung   nodules. There are no central endobronchial lesions.    The liver, gallbladder, spleen, pancreas, adrenal glands, kidneys,   urinary bladder are unremarkable. The uterus and the adnexa are   unremarkable given the recent . Trace free fluid is noted within   the pelvis. There is no bowel obstruction or free intraperitoneal air.   There is a flat-shaped small amount of fluid between the uterus and the   bladder representing a bladder flap hematoma. The appendix is normal.   There is thrombosis of the right gonadal vein and one of its sidebranches.    Postoperative changes are seen along the lower anterior abdominal wall.    Evaluation of the bones demonstrate no significant abnormality.    IMPRESSION: No pulmonary arterial emboli as above.    Thrombosis of the right gonadal vein.    Small amount of fluid between the bladder and the uterus representing   small amount of bladder flap hematoma, postoperative in etiology.

## 2019-03-14 NOTE — CONSULT NOTE ADULT - NSICDXPROBLEM_GEN_ALL_CORE_FT
PROBLEM DIAGNOSES  Problem: Dyspnea  Recommendation: -As CT angio negative for PE, concerned for possible postpartum cardiomyopathy. Recommend echo for further evaluation.    Problem: Right sided abdominal pain  Recommendation: -Pain likely 2/2 gonadal vein thrombosis. Patient should be placed on therapeutic anticoagulation.  -As patient has associated RLE pain, would also recommend LE dopplers to evaluate the extent of the right pelvic thrombosis; concern for possible extension  -Recommend hematology consult for anticoagulation recs and outpatient follow-up   D/w Dr. Ross and GYN team  Shelli Hamm PGY-1

## 2019-03-14 NOTE — ED ADULT NURSE NOTE - CHIEF COMPLAINT QUOTE
Pt 4 weeks s/p c section, c/o RLQ abd pain x 2 weeks,, pt denies any s/s infection at incision site, denies NVD, pt also c/o sob x 2 weeks, pt breathing even and unlabored, denies cp, sating 100% on room air. Pt appears comfortable.

## 2019-03-14 NOTE — ED CDU PROVIDER INITIAL DAY NOTE - OBJECTIVE STATEMENT
24 y/o female with no significant PMHx presents to the ER s/p  on  c/o 2 weeks of shortness of breath and RLQ pain.  Pt denies chest pain, nausea, vomiting, diarrhea, weakness, dizziness, fevers, chills, back pain, palpitations, vaginal bleeding, vaginal discharge.

## 2019-03-14 NOTE — CONSULT NOTE ADULT - ASSESSMENT
22 y/o , 4 weeks postpartum s/p , presents to the ED c/o worsening SOB and RLQ pain. Physical exam significant for RLQ tenderness. VS stable. CT angio significant for right gonadal vein thrombosis and negative for pulmonary emboli.

## 2019-03-14 NOTE — ED CDU PROVIDER INITIAL DAY NOTE - PROGRESS NOTE DETAILS
When patient was placed on tele monitor notified by RN that pt was angela with heart rate in high 30s- low 40s. Pt assessed bedside, sitting on phone without complaints, denies any chest pain, dizziness, nausea. Repeat EKG showing sinus angela 39. Discussed case with Dr. Longoria on blue side- will admit patient to medicine on tele. Cards was consulted, currently bedside evaluating patient. When patient was placed on tele monitor notified by RN that pt was angela with heart rate in high 30s- low 40s. Pt assessed bedside, sitting on phone without complaints, denies any chest pain, palpitations, lightheadedness, dizziness, nausea, sob. Repeat EKG showing sinus angela 39. Discussed case with Dr. Longoria on blue side- will admit patient to medicine on tele. Cards was consulted, currently evaluating patient bedside. Cards recommending echo and will follow inpatient. Discussed with hospitalist Dr. Nix who accepted patient. text paged Tele PA. pt stable. When patient was placed on tele monitor notified by RN that pt was angela with heart rate in high 30s- low 40s. Pt reassessed bedside, sitting on phone without complaints, denies any chest pain, palpitations, lightheadedness, dizziness, nausea, sob. Repeat EKG showing sinus angela 39. Discussed case with Dr. Longoria on blue side- will admit patient to medicine on tele. Cards was consulted, currently evaluating patient bedside.

## 2019-03-14 NOTE — ED ADULT TRIAGE NOTE - CHIEF COMPLAINT QUOTE
Pt 4 weeks s/p c section, c/o RLQ abd pain, pt denies any s/s infection at incision site, denies NVD, pt also c/o sob x 2 weeks, pt breathing even and unlabored, denies cp, sating 100% on room air. Pt appears comfortable. Pt 4 weeks s/p c section, c/o RLQ abd pain x 2 weeks,, pt denies any s/s infection at incision site, denies NVD, pt also c/o sob x 2 weeks, pt breathing even and unlabored, denies cp, sating 100% on room air. Pt appears comfortable.

## 2019-03-14 NOTE — ED CDU PROVIDER INITIAL DAY NOTE - ATTENDING CONTRIBUTION TO CARE
MD Rey:  patient seen and evaluated with the resident.  I was present for key portions of the History & Physical, and I agree with the Impression & Plan.  MD Rey:  24 yo F, 4wk post-partum s/p , c/o 2wks of SOB.  Associated Sx: RLQ pain since the .  Context:  seen and evaluated in the ED for r/o PE and/or post-op abscess (ddimer was > 2000).  CTA demonstrated no large PE, but + R gonadal vein thrombosis.  Seen in the ED by OB/GYN, who feel that AC is appropriate for this condition and is safe 4wks post-op.  However, this does not explain her SOB.  Patient was sent to the CDU for r/o post-partum cardiomyopathy.    VS: wnl.  Physical Exam: adult F, NAD, NCAT, PERRL, EOMI, neck supple, RRR, CTA B, Abd: s/nd/ + RLQ TTP, skin:  incision c/d/i, Ext: no edema, Neuro: AAOx3, ambulates w/o diff, strength 5/5 & symmetric throughout, no calf pain.  Impression:  1) SOB of unclear etiology; possible post-partum cardiomyopathy; no [large] PE on CTA chest but test was of suboptimal quality.  Patient is hemodynamically wnl and w/o hypoxia.\  2) R gonadal vein thrombosis.  Likely complication of , unlikely to require long-term AC, but rather short-term.    Plan 1)  TTE.  2) d/c home on Xarelto, f/u with GYN re: duration of therapy and follow up.  At time of this note, their consult is incomplete.

## 2019-03-14 NOTE — ED CDU PROVIDER DISPOSITION NOTE - CLINICAL COURSE
Case reviewed with GUSTAVO Suarez.   Patient presented to ed with shortness of breath, found to have gonadal vein thrombosis, seen by gyn and started on xarelto, sent to cdu for echo, with no new symptoms while in cdu, found to be persistent sinus angela in high 30's without symptoms, mentating well, bp stable, comfortable appearing. Cardiology was called, patient was admitted to medicine on tele for further management.

## 2019-03-14 NOTE — ED PROVIDER NOTE - CARE PLAN
Principal Discharge DX:	Ovarian vein injury  Secondary Diagnosis:	Shortness of breath  Secondary Diagnosis:	Near syncope

## 2019-03-14 NOTE — ED CDU PROVIDER INITIAL DAY NOTE - MEDICAL DECISION MAKING DETAILS
Impression:  1) SOB of unclear etiology; possible post-partum cardiomyopathy; no [large] PE on CTA chest but test was of suboptimal quality.  Patient is hemodynamically wnl and w/o hypoxia.\  2) R gonadal vein thrombosis.  Likely complication of , unlikely to require long-term AC, but rather short-term.    Plan 1)  TTE.  2) d/c home on Xarelto.

## 2019-03-15 DIAGNOSIS — R06.09 OTHER FORMS OF DYSPNEA: ICD-10-CM

## 2019-03-15 DIAGNOSIS — R06.02 SHORTNESS OF BREATH: ICD-10-CM

## 2019-03-15 DIAGNOSIS — I82.409 ACUTE EMBOLISM AND THROMBOSIS OF UNSPECIFIED DEEP VEINS OF UNSPECIFIED LOWER EXTREMITY: ICD-10-CM

## 2019-03-15 DIAGNOSIS — R00.1 BRADYCARDIA, UNSPECIFIED: ICD-10-CM

## 2019-03-15 DIAGNOSIS — D64.9 ANEMIA, UNSPECIFIED: ICD-10-CM

## 2019-03-15 DIAGNOSIS — R93.89 ABNORMAL FINDINGS ON DIAGNOSTIC IMAGING OF OTHER SPECIFIED BODY STRUCTURES: ICD-10-CM

## 2019-03-15 LAB
ANION GAP SERPL CALC-SCNC: 13 MMO/L — SIGNIFICANT CHANGE UP (ref 7–14)
BUN SERPL-MCNC: 11 MG/DL — SIGNIFICANT CHANGE UP (ref 7–23)
CALCIUM SERPL-MCNC: 9.6 MG/DL — SIGNIFICANT CHANGE UP (ref 8.4–10.5)
CHLORIDE SERPL-SCNC: 103 MMOL/L — SIGNIFICANT CHANGE UP (ref 98–107)
CO2 SERPL-SCNC: 22 MMOL/L — SIGNIFICANT CHANGE UP (ref 22–31)
CREAT SERPL-MCNC: 0.59 MG/DL — SIGNIFICANT CHANGE UP (ref 0.5–1.3)
FERRITIN SERPL-MCNC: 110.6 NG/ML — SIGNIFICANT CHANGE UP (ref 15–150)
GLUCOSE SERPL-MCNC: 82 MG/DL — SIGNIFICANT CHANGE UP (ref 70–99)
HCT VFR BLD CALC: 36.9 % — SIGNIFICANT CHANGE UP (ref 34.5–45)
HGB BLD-MCNC: 10.7 G/DL — LOW (ref 11.5–15.5)
MCHC RBC-ENTMCNC: 24.4 PG — LOW (ref 27–34)
MCHC RBC-ENTMCNC: 29 % — LOW (ref 32–36)
MCV RBC AUTO: 84.2 FL — SIGNIFICANT CHANGE UP (ref 80–100)
NRBC # FLD: 0 K/UL — LOW (ref 25–125)
PLATELET # BLD AUTO: 274 K/UL — SIGNIFICANT CHANGE UP (ref 150–400)
PMV BLD: 11.2 FL — SIGNIFICANT CHANGE UP (ref 7–13)
POTASSIUM SERPL-MCNC: 4 MMOL/L — SIGNIFICANT CHANGE UP (ref 3.5–5.3)
POTASSIUM SERPL-SCNC: 4 MMOL/L — SIGNIFICANT CHANGE UP (ref 3.5–5.3)
RBC # BLD: 4.38 M/UL — SIGNIFICANT CHANGE UP (ref 3.8–5.2)
RBC # FLD: 15.9 % — HIGH (ref 10.3–14.5)
SODIUM SERPL-SCNC: 138 MMOL/L — SIGNIFICANT CHANGE UP (ref 135–145)
TROPONIN T, HIGH SENSITIVITY: < 6 NG/L — SIGNIFICANT CHANGE UP (ref ?–14)
WBC # BLD: 3.78 K/UL — LOW (ref 3.8–10.5)
WBC # FLD AUTO: 3.78 K/UL — LOW (ref 3.8–10.5)

## 2019-03-15 PROCEDURE — 99223 1ST HOSP IP/OBS HIGH 75: CPT

## 2019-03-15 PROCEDURE — 99232 SBSQ HOSP IP/OBS MODERATE 35: CPT

## 2019-03-15 PROCEDURE — 93306 TTE W/DOPPLER COMPLETE: CPT | Mod: 26

## 2019-03-15 PROCEDURE — 12345: CPT | Mod: NC

## 2019-03-15 PROCEDURE — 99233 SBSQ HOSP IP/OBS HIGH 50: CPT | Mod: GC

## 2019-03-15 RX ORDER — SENNA PLUS 8.6 MG/1
2 TABLET ORAL AT BEDTIME
Qty: 0 | Refills: 0 | Status: DISCONTINUED | OUTPATIENT
Start: 2019-03-15 | End: 2019-03-16

## 2019-03-15 RX ORDER — DOCUSATE SODIUM 100 MG
100 CAPSULE ORAL THREE TIMES A DAY
Qty: 0 | Refills: 0 | Status: DISCONTINUED | OUTPATIENT
Start: 2019-03-15 | End: 2019-03-16

## 2019-03-15 RX ADMIN — RIVAROXABAN 15 MILLIGRAM(S): KIT at 05:58

## 2019-03-15 RX ADMIN — RIVAROXABAN 15 MILLIGRAM(S): KIT at 20:26

## 2019-03-15 RX ADMIN — Medication 100 MILLIGRAM(S): at 05:58

## 2019-03-15 NOTE — PROGRESS NOTE ADULT - SUBJECTIVE AND OBJECTIVE BOX
Patient feeling much better today.  No shortness of breath, palpitations or lightheadedness. Heart rate at rest sinus rhythm 50-60's.  When standing at bedside and then taking a few steps there is an appropriate increase in her heart rate to 80-90 bpm suggesting chronotropic competence.     Patient denies history of anxiety but due to inability to sleep during the last two weeks, she was, on occasion, taking sleeping pills although she denies taking them the night before her admission.       Vital Signs Last 24 Hrs  T(C): 37.4 (15 Mar 2019 14:00), Max: 37.4 (15 Mar 2019 14:00)  T(F): 99.3 (15 Mar 2019 14:00), Max: 99.3 (15 Mar 2019 14:00)  HR: 52 (15 Mar 2019 14:00) (40 - 53)  BP: 127/70 (15 Mar 2019 14:00) (111/65 - 127/78)  BP(mean): --  RR: 17 (15 Mar 2019 14:00) (16 - 17)  SpO2: 100% (15 Mar 2019 14:00) (98% - 100%)      EKG  Telemetry: Sinus bradycardia 50's to normal sinus rhythm 60's. No symptoms throughout the day.  MEDICATIONS  (STANDING):  docusate sodium 100 milliGRAM(s) Oral three times a day  rivaroxaban 15 milliGRAM(s) Oral two times a day    MEDICATIONS  (PRN):  senna 2 Tablet(s) Oral at bedtime PRN Constipation          Physical exam:   Gen- well developed well nourished.  quiet but cooperative NAD  Resp- clear to auscultation.  No wheezing, rales or rhonchi  CV- S1 and S2 RRR. no murmurs, gallops or rubs  ABD- soft non tendeer + bowel sounds  EXT- no edema or calf tenderness.  Neuro- grossly nonfocal                            10.7   3.78  )-----------( 274      ( 15 Mar 2019 06:00 )             36.9     PT/INR - ( 14 Mar 2019 19:50 )   PT: 19.7 SEC;   INR: 1.74            03-15    138  |  103  |  11  ----------------------------<  82  4.0   |  22  |  0.59    Ca    9.6      15 Mar 2019 06:00    TPro  7.5  /  Alb  4.2  /  TBili  < 0.2<L>  /  DBili  x   /  AST  20  /  ALT  17  /  AlkPhos  87  03-14

## 2019-03-15 NOTE — H&P ADULT - ASSESSMENT
23-year-old female with recent  (subsequently lost the baby) presenting from home with shortness of breath on exertion for the past 2 weeks.

## 2019-03-15 NOTE — H&P ADULT - NSHPREVIEWOFSYSTEMS_GEN_ALL_CORE
REVIEW OF SYSTEMS:    CONSTITUTIONAL: No weakness, fevers or chills; reports 2 episodes of night sweats last week  EYES/ENT: No visual changes;  No dysphagia  NECK: No pain or stiffness  RESPIRATORY: No cough, wheezing, hemoptysis; No shortness of breath at rest (+)GILL; continued milk let down per patient, no breast pain (not pumping)  CARDIOVASCULAR: No chest pain or palpitations; No lower extremity edema  GASTROINTESTINAL: No abdominal or epigastric pain. No nausea, vomiting, or hematemesis; No diarrhea or constipation. No melena or hematochezia.  GENITOURINARY: No dysuria, frequency or hematuria; brown vaginal discharge since   NEUROLOGICAL: No numbness or weakness  PSYCH: denies depression  SKIN: No itching, burning, rashes, or lesions   All other review of systems is negative unless indicated above.

## 2019-03-15 NOTE — PROGRESS NOTE ADULT - SUBJECTIVE AND OBJECTIVE BOX
Patient is a 23y old  Female who presents with a chief complaint of GILL (15 Mar 2019 14:53)      SUBJECTIVE / OVERNIGHT EVENTS: Pt seen and examined at 1:05pm, no overnight events, pt denies any chest pain, sob or abdominal pain, no other issues reported, tele with sinus angela at 53/mt, no other new issues.    MEDICATIONS  (STANDING):  docusate sodium 100 milliGRAM(s) Oral three times a day  rivaroxaban 15 milliGRAM(s) Oral two times a day    MEDICATIONS  (PRN):  senna 2 Tablet(s) Oral at bedtime PRN Constipation      Vital Signs Last 24 Hrs  T(C): 37.4 (15 Mar 2019 14:00), Max: 37.4 (15 Mar 2019 14:00)  T(F): 99.3 (15 Mar 2019 14:00), Max: 99.3 (15 Mar 2019 14:00)  HR: 52 (15 Mar 2019 14:00) (40 - 53)  BP: 127/70 (15 Mar 2019 14:00) (111/65 - 127/78)  BP(mean): --  RR: 17 (15 Mar 2019 14:00) (16 - 17)  SpO2: 100% (15 Mar 2019 14:00) (98% - 100%)  CAPILLARY BLOOD GLUCOSE        I&O's Summary      PHYSICAL EXAM:  GENERAL: NAD, well-developed  CHEST/LUNG: Clear to auscultation bilaterally; No wheeze  HEART: S1, s2, mild angela to 56/mt  ABDOMEN: Soft, mildy tender at the RLQ, Nondistended  EXTREMITIES: no LE edema  PSYCH: Calm  NEUROLOGY: AAOx3  SKIN: No rashes or lesions    LABS:                        10.7   3.78  )-----------( 274      ( 15 Mar 2019 06:00 )             36.9     03-15    138  |  103  |  11  ----------------------------<  82  4.0   |  22  |  0.59    Ca    9.6      15 Mar 2019 06:00    TPro  7.5  /  Alb  4.2  /  TBili  < 0.2<L>  /  DBili  x   /  AST  20  /  ALT  17  /  AlkPhos  87  03-14    PT/INR - ( 14 Mar 2019 19:50 )   PT: 19.7 SEC;   INR: 1.74                    RADIOLOGY & ADDITIONAL TESTS:    Imaging Personally Reviewed:    Consultant(s) Notes Reviewed:      Care Discussed with Consultants/Other Providers:

## 2019-03-15 NOTE — H&P ADULT - NSHPPHYSICALEXAM_GEN_ALL_CORE
PHYSICAL EXAM:  GENERAL: NAD, well-developed, well-nourished  HEAD:  Atraumatic, Normocephalic  EYES: EOMI, pupils 4mm b/l ERRLA, conjunctiva and sclera clear  NECK: Supple, No JVD  CHEST/LUNG: Clear to auscultation bilaterally; No wheezes, rales or rhonchi  HEART: Bradycardic regular rhythm; No murmurs, rubs, or gallops, (+)S1, S2  ABDOMEN: Soft, Nontender, Nondistended; Normal Bowel sounds; horizontal lower abdominal scar without drainage or surrounding erythema, well healed   EXTREMITIES:  2+ Peripheral Pulses, No clubbing, cyanosis, or edema  PSYCH: normal mood and affect; denies SI/HI  NEUROLOGY: AAOx3, non-focal  SKIN: No rashes or lesions

## 2019-03-15 NOTE — PROGRESS NOTE ADULT - NSICDXPROBLEM_GEN_ALL_CORE_FT
PROBLEM DIAGNOSES  Problem: Abnormal finding on CT scan  Assessment and Plan: small post-op hematoma noted on CT, mild ttp RLQ, cont to monitor for worsening symptoms   serial abdominal exams while on full a/c    needs outpatient f/u with gyn, patient reportedly planning on returning to [place] soon, will need to reinforce outpatient f/u    Problem: Normocytic anemia  Assessment and Plan: in setting of recent pregnancy/    recent iron studies and TSH noted    will check ferritin with next blood draw    Problem: Dyspnea on exertion  Assessment and Plan: elevated pro-BNP, recent pregnancy    TTE pending, f.u results   monitor on tele in light of bradycardia, f/u EP recs    Problem: Deep vein thrombosis  Assessment and Plan: gonadal vein thrombosis    appreciate gyn recs, started on rivaroxaban     will need to ensure patient can afford on discharge, speak w/CM in AM, patient lives in [place] and reportedly plan on returning soon    no extension of DVT noted on LE dopplers    will need to reinforce outpatient f/u with providers in [place] on discharge, encouraged patient to obtain copy of records prior to discharge to take with her when she returns home    Problem: Bradycardia  Assessment and Plan: unclear etiology    appreciate EP recs    plan for TTE, f/u further EP recs    monitor on tele     avoid AVNB    no chest pain    Problem: Right sided abdominal pain  Assessment and Plan: -Pain likely 2/2 gonadal vein thrombosis. Patient should be placed on therapeutic anticoagulation.    -As patient has associated RLE pain, would also recommend LE dopplers to evaluate the extent of the right pelvic thrombosis; concern for possible extension   Doppler of RLE neg for dvt   - hematology consulted, f/u consult recs            Problem: Dyspnea  Assessment and Plan: -As CT angio negative for PE, concerned for possible postpartum cardiomyopathy. Recommend echo for further evaluation. f/u Echo PROBLEM DIAGNOSES  Problem: Deep vein thrombosis  Assessment and Plan: gonadal vein thrombosis    appreciate gyn recs, started on rivaroxaban     will need to ensure patient can afford on discharge, speak w/CM in AM, patient lives in [place] and reportedly plan on returning soon    no extension of DVT noted on LE dopplers    will need to reinforce outpatient f/u with providers in [place] on discharge, encouraged patient to obtain copy of records prior to discharge to take with her when she returns home    Problem: Abnormal finding on CT scan  Assessment and Plan: small post-op hematoma noted on CT, mild ttp RLQ, cont to monitor for worsening symptoms   serial abdominal exams while on full a/c    needs outpatient f/u with gyn, patient reportedly planning on returning to [place] soon, will need to reinforce outpatient f/u    Problem: Right sided abdominal pain  Assessment and Plan: -Pain likely 2/2 gonadal vein thrombosis. Patient should be placed on therapeutic anticoagulation.    -As patient has associated RLE pain, would also recommend LE dopplers to evaluate the extent of the right pelvic thrombosis; concern for possible extension   Doppler of RLE neg for dvt   - hematology consulted, f/u consult recs            Problem: Dyspnea on exertion  Assessment and Plan: elevated pro-BNP, recent pregnancy    TTE pending, f.u results   monitor on tele in light of bradycardia, f/u EP recs  elevated pro-BNP, recent pregnancy      TTE pending, f.u results     monitor on tele in light of bradycardia, f/u EP recs    Problem: Normocytic anemia  Assessment and Plan: in setting of recent pregnancy/    recent iron studies and TSH noted    ferritin nl    Problem: Bradycardia  Assessment and Plan: unclear etiology    appreciate EP recs    plan for TTE, f/u further EP recs    monitor on tele     avoid AVNB    no chest pain    Problem: Dyspnea  Assessment and Plan: -As CT angio negative for PE, concerned for possible postpartum cardiomyopathy. Recommend echo for further evaluation. f/u Echo PROBLEM DIAGNOSES  Problem: Deep vein thrombosis  Assessment and Plan: gonadal vein thrombosis    appreciate gyn recs, started on rivaroxaban     will need to ensure patient can afford on discharge, speak w/CM in AM, patient lives in [place] and reportedly plan on returning soon    no extension of DVT noted on LE dopplers    will need to reinforce outpatient f/u with providers in [place] on discharge, encouraged patient to obtain copy of records prior to discharge to take with her when she returns home    Problem: Abnormal finding on CT scan  Assessment and Plan: small post-op hematoma noted on CT, mild ttp RLQ, cont to monitor for worsening symptoms   serial abdominal exams while on full a/c    needs outpatient f/u with gyn, patient reportedly planning on returning to [place] soon, will need to reinforce outpatient f/u    Problem: Right sided abdominal pain  Assessment and Plan: -Pain likely 2/2 gonadal vein thrombosis. Patient should be placed on therapeutic anticoagulation.    -As patient has associated RLE pain, would also recommend LE dopplers to evaluate the extent of the right pelvic thrombosis; concern for possible extension   Doppler neg for dvt   - hematology consulted, f/u consult recs            Problem: Dyspnea on exertion  Assessment and Plan: elevated pro-BNP, recent pregnancy    TTE pending, f.u results   monitor on tele in light of bradycardia, f/u EP recs  elevated pro-BNP, recent pregnancy          Problem: Normocytic anemia  Assessment and Plan: in setting of recent pregnancy/    recent iron studies and TSH noted    ferritin nl    Problem: Bradycardia  Assessment and Plan: unclear etiology    appreciate EP recs    plan for TTE, f/u further EP recs    monitor on tele     avoid AVNB    no chest pain    Problem: Dyspnea  Assessment and Plan: -As CT angio negative for PE, concerned for possible postpartum cardiomyopathy. Recommend echo for further evaluation. f/u Echo

## 2019-03-15 NOTE — CONSULT NOTE ADULT - ASSESSMENT
24 y/o , 4 weeks postpartum s/p , presents to the ED c/o worsening SOB and RLQ pain.  R CT angio significant for right gonadal vein thrombosis and negative for pulmonary emboli.     #Right Gonadal Vein Thrombosis 22 y/o , 4 weeks postpartum s/p , presents to the ED c/o worsening SOB and RLQ pain.  R CT angio significant for right gonadal vein thrombosis and negative for pulmonary emboli.     #Right Gonadal Vein Thrombosis   Patient with recent  and post partum state that are risk factors for thrombosis. Given family history of blood disorders. We will do hypercoagulable work-up.   We will order Protein C, Protein S   Anti-thrombin III assay,   prothrombin gene mutation   APLS labs    PAtient is ok to be discharge on xarelto and follow up in the hematology clinic     Tracey Berry   Heme onc fellow   [phone]

## 2019-03-15 NOTE — H&P ADULT - HISTORY OF PRESENT ILLNESS
23-year-old female with recent  (subsequently lost the baby) presenting from home with shortness of breath on exertion for the past 2 weeks.  She denies any lightheadedness/dizziness, fevers, chills, chest pain, palpitations, cough, sick contacts, nausea, vomiting, abdominal pain, diarrhea, dysuria, hematuria, paresthesias, numbness.  Denies sick contacts or rashes.  Reports only recent travel was from [place] 2018.    Per patient, no history of VTE or miscarriages.    In the ED VS:  98  40-56  111-127/65-78  16-18  %RA, started on rivaroxaban 15mg

## 2019-03-15 NOTE — H&P ADULT - NSICDXPROBLEM_GEN_ALL_CORE_FT
PROBLEM DIAGNOSES  Problem: Bradycardia  Assessment and Plan: unclear etiology  appreciate EP recs  plan for TTE, f/u further EP recs  monitor on tele, ambulate in AM   avoid AVNB  no chest pain    Problem: Deep vein thrombosis  Assessment and Plan: gonadal vein thrombosis  appreciate gyn recs, started on rivaroxaban   will need to ensure patient can afford on discharge, speak w/CM in AM, patient lives in [place] and reportedly plan on returning soon  no extension of DVT noted on LE dopplers  will need to reinforce outpatient f/u with providers in [place] on discharge, encouraged patient to obtain copy of records prior to discharge to take with her when she returns home    Problem: Normocytic anemia  Assessment and Plan: in setting of recent pregnancy/  recent iron studies and TSH noted  will check ferritin with next blood draw    Problem: Abnormal finding on CT scan  Assessment and Plan: small post-op hematoma noted on CT, abdomen soft on my exam  serial abdominal exams while on full a/c  needs outpatient f/u with gyn, patient reportedly planning on returning to [place] soon, will need to reinforce outpatient f/u    Problem: Dyspnea on exertion  Assessment and Plan: elevated pro-BNP, recent pregnancy  TTE pending  monitor on tele in light of bradycardia

## 2019-03-15 NOTE — PROGRESS NOTE ADULT - ASSESSMENT
23-year-old female with recent  (subsequently lost the baby) presenting from home with shortness of breath on exertion for the past 2 weeks found to be bradycardic in the 30s, EP consulted, await echo and heme consult.

## 2019-03-15 NOTE — CONSULT NOTE ADULT - SUBJECTIVE AND OBJECTIVE BOX
Hematology Consult Note    HPI:  22 y/o , 4 weeks postpartum status post primary  19 2/ NRFHT, presents to the ED c/o worsening SOB x 2 weeks and RLQ pain x 3 weeks. Patient reports dyspnea on exertion, both when walking up the stairs and when ambulating on level ground. Denies associated palpitations, chest pain. No changes in SOB upon lying flat. RLQ pain has been intermittent.  Denies heavy vaginal bleeding. She did not try anything OTC to relieve the pain. RLQ pain associated with RLE discomfort. RLE is not swollen. Denies skin changes.    POB:  s/p primary  @ 37+ wks GA for NRFHT 19, pregnancy complicated by known fetal CNS anomalies and subsequent  death    Per patient, no history of VTE or miscarriages.    In the ED VS:  98  40-56  111-127/65-78  16-18  %RA, started on rivaroxaban 15mg (15 Mar 2019 03:26)      Allergies    No Known Allergies    Intolerances        MEDICATIONS  (STANDING):  docusate sodium 100 milliGRAM(s) Oral three times a day  rivaroxaban 15 milliGRAM(s) Oral two times a day    MEDICATIONS  (PRN):  senna 2 Tablet(s) Oral at bedtime PRN Constipation      PAST MEDICAL & SURGICAL HISTORY:  No pertinent past medical history  S/P       FAMILY HISTORY:  Family history of diabetes mellitus (DM) (Grandparent)      SOCIAL HISTORY: No EtOH, no tobacco    REVIEW OF SYSTEMS:    CONSTITUTIONAL: No weakness, fevers or chills  EYES/ENT: No visual changes;  No vertigo or throat pain   NECK: No pain or stiffness  RESPIRATORY: No cough, wheezing, hemoptysis; No shortness of breath  CARDIOVASCULAR: No chest pain or palpitations  GASTROINTESTINAL: No abdominal or epigastric pain. No nausea, vomiting, or hematemesis; No diarrhea or constipation. No melena or hematochezia.  GENITOURINARY: No dysuria, frequency or hematuria  NEUROLOGICAL: No numbness or weakness  SKIN: No itching, burning, rashes, or lesions   All other review of systems is negative unless indicated above.        T(F): 98.9 (03-15-19 @ 10:00), Max: 98.9 (03-15-19 @ 10:00)  HR: 53 (03-15-19 @ 10:00)  BP: 114/68 (03-15-19 @ 10:00)  RR: 17 (03-15-19 @ 10:00)  SpO2: 100% (03-15-19 @ 10:00)  Wt(kg): --    GENERAL: NAD, well-developed  HEAD:  Atraumatic, Normocephalic  EYES: EOMI, PERRLA, conjunctiva and sclera clear  NECK: Supple, No JVD  CHEST/LUNG: Clear to auscultation bilaterally; No wheeze  HEART: Regular rate and rhythm; No murmurs, rubs, or gallops  ABDOMEN: Soft, Nontender, Nondistended; Bowel sounds present  EXTREMITIES:  2+ Peripheral Pulses, No clubbing, cyanosis, or edema  NEUROLOGY: non-focal  SKIN: No rashes or lesions                          10.7   3.78  )-----------( 274      ( 15 Mar 2019 06:00 )             36.9       03-15    138  |  103  |  11  ----------------------------<  82  4.0   |  22  |  0.59    Ca    9.6      15 Mar 2019 06:00    TPro  7.5  /  Alb  4.2  /  TBili  < 0.2<L>  /  DBili  x   /  AST  20  /  ALT  17  /  AlkPhos  87  03-14

## 2019-03-15 NOTE — PROGRESS NOTE ADULT - ASSESSMENT
24 y/o female  s/p  4 weeks ago c/o progressive SOB over the last 2 weeks, especially on exertion.  Currently, denies chest pain, nausea, vomiting or diarrhea.  No fevers or cough. Denies drug use/ETOH use. Now with bradycardia    CTA negative for large PE but  + for thrombosis of the right gonadal vein. Started on Xarelto 15 mg daily.  Negative cardiac enzymes.   Cause of bradycardia likely vagally mediated. Complete resolution of symptoms today. No further bradycardia episodes. Chronotropically competent.   Ok for discharge from our perspective.  Awaiting input for hematology.

## 2019-03-15 NOTE — H&P ADULT - NSHPSOCIALHISTORY_GEN_ALL_CORE
Lives in [place], reports that she traveled here for vacation in 12/2018, no other recent travel since that time  Currently staying with family  Denies tobacco or illicit drug use  Occasional alcohol use, denies regular use  Not currently employed

## 2019-03-15 NOTE — H&P ADULT - NSHPLABSRESULTS_GEN_ALL_CORE
142  |  107  |  8   ----------------------------<  90  4.1   |  23  |  0.58    Ca    10.2      14 Mar 2019 09:40    TPro  7.5  /  Alb  4.2  /  TBili  < 0.2<L>  /  DBili  x   /  AST  20  /  ALT  17  /  AlkPhos  87                          10.5   4.17  )-----------( 267      ( 14 Mar 2019 09:40 )             36.3     PT/INR - ( 14 Mar 2019 19:50 )   PT: 19.7 SEC;   INR: 1.74     D-Dimer Assay, Quantitative: 2378 ng/mL (19 @ 09:40)    Troponin T, High Sensitivity: 13 ng/L (19 @ 09:40)  Troponin T, High Sensitivity: < 6 ng/L (19 @ 19:50)    Serum Pro-Brain Natriuretic Peptide: 29.56 pg/mL (19 @ 09:40)    < from: Xray Chest 2 Views PA/Lat (19 @ 09:58) >  IMPRESSION: Normal chest  < end of copied text >    < from: CT Angio Chest w/ IV Cont (19 @ 11:47) >/< from: CT Abdomen and Pelvis w/ IV Cont (19 @ 11:46) >  CT pulmonary angiogram was performed following intravenous administration of 90 cc of Omnipaque 350. 10 cc of contrast was discarded. MIP images   are submitted. CT of the abdomen and pelvis was also performed. This study is limited for evaluation of some of the segmental and subsegmental pulmonary arterial branches due to poor contrast opacification respiratory motion artifact. No pulmonary arterial emboli are identified. There are no enlarged bilateral axillary, mediastinal or hilar lymph nodes. There is no pericardial effusion. There is a trace left pleural effusion. Evaluation of the lungs demonstrate no pneumonia. There are no lung nodules. There are no central endobronchial lesions. The liver, gallbladder, spleen, pancreas, adrenal glands, kidneys, urinary bladder are unremarkable. The uterus and the adnexa are unremarkable given the recent . Trace free fluid is noted within   the pelvis. There is no bowel obstruction or free intraperitoneal air. There is a flat-shaped small amount of fluid between the uterus and the bladder representing a bladder flap hematoma. The appendix is normal. There is thrombosis of the right gonadal vein and one of its side branches. Postoperative changes are seen along the lower anterior abdominal wall. Evaluation of the bones demonstrate no significant abnormality.  IMPRESSION: No pulmonary arterial emboli as above. Thrombosis of the right gonadal vein. Small amount of fluid between the bladder and the uterus representing small amount of bladder flap hematoma, postoperative in etiology.  < end of copied text >    < from: US Duplex Venous Lower Ext Complete, Bilateral (19 @ 20:16) >  There is normal compressibility of the bilateral common femoral, femoral and popliteal veins. No calf vein thrombosis is detected. Doppler examination shows normal spontaneous and phasic flow.  IMPRESSION: No evidence of bilateral lower extremity deep venous thrombosis.  < end of copied text >    EKGs personally reviewed - 39-59bpm sinus bradycardia TWI V1-V2; QTc 403ms

## 2019-03-15 NOTE — H&P ADULT - NSICDXFAMILYHX_GEN_ALL_CORE_FT
FAMILY HISTORY:  Grandparent  Still living? Unknown  Family history of diabetes mellitus (DM), Age at diagnosis: Age Unknown

## 2019-03-16 ENCOUNTER — TRANSCRIPTION ENCOUNTER (OUTPATIENT)
Age: 24
End: 2019-03-16

## 2019-03-16 VITALS
OXYGEN SATURATION: 100 % | SYSTOLIC BLOOD PRESSURE: 100 MMHG | TEMPERATURE: 98 F | RESPIRATION RATE: 18 BRPM | DIASTOLIC BLOOD PRESSURE: 51 MMHG | HEART RATE: 68 BPM

## 2019-03-16 LAB
ANION GAP SERPL CALC-SCNC: 16 MMO/L — HIGH (ref 7–14)
APTT BLD: 43 SEC — HIGH (ref 27.5–36.3)
AT III ACT/NOR PPP CHRO: 118 % — SIGNIFICANT CHANGE UP (ref 76–140)
BUN SERPL-MCNC: 13 MG/DL — SIGNIFICANT CHANGE UP (ref 7–23)
CALCIUM SERPL-MCNC: 9.9 MG/DL — SIGNIFICANT CHANGE UP (ref 8.4–10.5)
CHLORIDE SERPL-SCNC: 104 MMOL/L — SIGNIFICANT CHANGE UP (ref 98–107)
CO2 SERPL-SCNC: 21 MMOL/L — LOW (ref 22–31)
CREAT SERPL-MCNC: 0.6 MG/DL — SIGNIFICANT CHANGE UP (ref 0.5–1.3)
GLUCOSE SERPL-MCNC: 81 MG/DL — SIGNIFICANT CHANGE UP (ref 70–99)
HCT VFR BLD CALC: 36.9 % — SIGNIFICANT CHANGE UP (ref 34.5–45)
HGB BLD-MCNC: 10.6 G/DL — LOW (ref 11.5–15.5)
INR BLD: 1.7 — HIGH (ref 0.88–1.17)
MAGNESIUM SERPL-MCNC: 1.9 MG/DL — SIGNIFICANT CHANGE UP (ref 1.6–2.6)
MCHC RBC-ENTMCNC: 24.3 PG — LOW (ref 27–34)
MCHC RBC-ENTMCNC: 28.7 % — LOW (ref 32–36)
MCV RBC AUTO: 84.4 FL — SIGNIFICANT CHANGE UP (ref 80–100)
NRBC # FLD: 0 K/UL — LOW (ref 25–125)
PLATELET # BLD AUTO: 267 K/UL — SIGNIFICANT CHANGE UP (ref 150–400)
PMV BLD: 11.1 FL — SIGNIFICANT CHANGE UP (ref 7–13)
POTASSIUM SERPL-MCNC: 4 MMOL/L — SIGNIFICANT CHANGE UP (ref 3.5–5.3)
POTASSIUM SERPL-SCNC: 4 MMOL/L — SIGNIFICANT CHANGE UP (ref 3.5–5.3)
PROTHROM AB SERPL-ACNC: 19.7 SEC — HIGH (ref 9.8–13.1)
RBC # BLD: 4.37 M/UL — SIGNIFICANT CHANGE UP (ref 3.8–5.2)
RBC # FLD: 15.9 % — HIGH (ref 10.3–14.5)
SODIUM SERPL-SCNC: 141 MMOL/L — SIGNIFICANT CHANGE UP (ref 135–145)
THROMBIN TIME: 25.7 SEC — HIGH (ref 16–25)
WBC # BLD: 4.2 K/UL — SIGNIFICANT CHANGE UP (ref 3.8–10.5)
WBC # FLD AUTO: 4.2 K/UL — SIGNIFICANT CHANGE UP (ref 3.8–10.5)

## 2019-03-16 PROCEDURE — 99239 HOSP IP/OBS DSCHRG MGMT >30: CPT

## 2019-03-16 PROCEDURE — G0452: CPT | Mod: 26

## 2019-03-16 RX ORDER — RIVAROXABAN 15 MG-20MG
1 KIT ORAL
Qty: 30 | Refills: 0 | OUTPATIENT
Start: 2019-03-16 | End: 2019-04-14

## 2019-03-16 RX ORDER — RIVAROXABAN 15 MG-20MG
1 KIT ORAL
Qty: 37 | Refills: 0 | OUTPATIENT
Start: 2019-03-16 | End: 2019-04-03

## 2019-03-16 RX ADMIN — RIVAROXABAN 15 MILLIGRAM(S): KIT at 06:04

## 2019-03-16 NOTE — PROGRESS NOTE ADULT - NSICDXPROBLEM_GEN_ALL_CORE_FT
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    -pain control               Problem: Dyspnea  Assessment and Plan: - CT angio negative for PE  -Echo w/ normal LV function

## 2019-03-16 NOTE — DISCHARGE NOTE PROVIDER - NSDCCPCAREPLAN_GEN_ALL_CORE_FT
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 Continue to take Xarelto as prescribed and monitor for bleeding.      SECONDARY DISCHARGE DIAGNOSES  Diagnosis: SOB (shortness of breath)  Assessment and Plan of Treatment: Your cardiac workup was negative.

## 2019-03-16 NOTE — DISCHARGE NOTE NURSING/CASE MANAGEMENT/SOCIAL WORK - NSDCDPATPORTLINK_GEN_ALL_CORE
You can access the [facility] Patient Portal, offered by [facility], by registering with the following website: [email or web address]

## 2019-03-16 NOTE — PROGRESS NOTE ADULT - ASSESSMENT
23-year-old female with recent  (subsequently lost the baby) presenting from home with shortness of breath on exertion for the past 2 weeks found to be bradycardic in the 30s

## 2019-03-16 NOTE — DISCHARGE NOTE PROVIDER - CARE PROVIDERS DIRECT ADDRESSES
[email or web address]

## 2019-03-16 NOTE — DISCHARGE NOTE PROVIDER - HOSPITAL COURSE
23 year old female with recent  (subsequently lost the baby) presented from home with shortness of breath on exertion. Pt was admitted to telemetry. CT angio chest, abdomen and pelvis showed, "No pulmonary arterial emboli as above. Thrombosis of the right gonadal vein. Small amount of fluid between the bladder and the uterus representing small amount of bladder flap hematoma, postoperative in etiology." Pt was started on Xarelto for anticoagulation. LE dopplers showed, "No evidence of bilateral lower extremity deep venous thrombosis." Echo showed, "Normal mitral valve. Minimal mitral regurgitation. Normal left ventricular internal dimensions and wall thicknesses. Normal left ventricular systolic function. No segmental wall motion abnormalities. Normal right ventricular size and function." Pt was also found to be bradycardic and was seen by EP, which is likely vagally mediated. No further workup as per EP. Case discussed with hospitalist on 3/16. Pt medically stable for discharge home. Eliquis is covered with $3 copay, no auth necessary. 23 year old female with recent  (subsequently lost the baby) presented from home with shortness of breath on exertion. Pt was admitted to telemetry. CT angio chest was negative for PE. TTE w/ normal EF, unremarkable. Patient also w/ R sided abdominal pain. CT of abdomen and pelvis showed, thrombosis of the right gonadal vein, and a small amount of fluid between the bladder and the uterus representing small amount of bladder flap hematoma, postoperative in etiology. Abdominal pain likely due to thrombosis of R gonadal vein, patient was anticoagulated with Xarelto. LE dopplers were negative for DVT. In addition, patient was also found to be bradycardic on admission and was seen by EP, which is likely vagally mediated. No further workup as per EP. Case discussed with hospitalist on 3/16. Pt medically stable for discharge home. Eliquis is covered with $3 copay, no auth necessary. 23 year old female with recent  (subsequently lost the baby) presented from home with shortness of breath on exertion. Pt was admitted to telemetry. CT angio chest was negative for PE. TTE w/ normal EF, unremarkable. Patient also w/ R sided abdominal pain. CT of abdomen and pelvis showed, thrombosis of the right gonadal vein, and a small amount of fluid between the bladder and the uterus representing small amount of bladder flap hematoma, postoperative in etiology. Abdominal pain likely due to acute thrombosis of R gonadal vein, patient was anticoagulated with Xarelto. LE dopplers were negative for DVT. In addition, patient was also found to be bradycardic on admission and was seen by EP, which is likely vagally mediated. No further workup as per EP. Case discussed with hospitalist on 3/16. Pt medically stable for discharge home. Eliquis is covered with $3 copay, no auth necessary.

## 2019-03-16 NOTE — DISCHARGE NOTE PROVIDER - CARE PROVIDER_API CALL
Pratima Ross (DO)  Obstetrics and Gynecology  [address], [address] [place] [address]  Phone: [phone]  Fax: [phone]  Follow Up Time:     Esme Ariza)  Cardiac Electrophysiology; Cardiology; Internal Medicine  [id number] [place] [address]  Phone: [phone]  Fax: [phone]  Follow Up Time:

## 2019-03-18 LAB
DRVVT SCREEN TO CONFIRM RATIO: 1.16 — SIGNIFICANT CHANGE UP (ref 0–1.2)
NORMALIZED SCT PPP-RTO: 0.75 — LOW (ref 0.88–1.27)
PROT C ACT/NOR PPP: 113 % — SIGNIFICANT CHANGE UP (ref 74–150)

## 2019-03-20 ENCOUNTER — OUTPATIENT (OUTPATIENT)
Dept: OUTPATIENT SERVICES | Facility: HOSPITAL | Age: 24
LOS: 1 days | Discharge: HOME | End: 2019-03-20

## 2019-03-20 ENCOUNTER — OUTPATIENT (OUTPATIENT)
Dept: OUTPATIENT SERVICES | Facility: HOSPITAL | Age: 24
LOS: 1 days | End: 2019-03-20

## 2019-03-20 ENCOUNTER — APPOINTMENT (OUTPATIENT)
Dept: OBGYN | Facility: HOSPITAL | Age: 24
End: 2019-03-20
Payer: MEDICAID

## 2019-03-20 VITALS
WEIGHT: 155 LBS | DIASTOLIC BLOOD PRESSURE: 69 MMHG | HEIGHT: 60 IN | SYSTOLIC BLOOD PRESSURE: 107 MMHG | BODY MASS INDEX: 30.43 KG/M2 | HEART RATE: 56 BPM

## 2019-03-20 DIAGNOSIS — D68.8 OTHER SPECIFIED COAGULATION DEFECTS: ICD-10-CM

## 2019-03-20 DIAGNOSIS — Z98.891 HISTORY OF UTERINE SCAR FROM PREVIOUS SURGERY: Chronic | ICD-10-CM

## 2019-03-20 DIAGNOSIS — Z86.718 PERSONAL HISTORY OF OTHER VENOUS THROMBOSIS AND EMBOLISM: ICD-10-CM

## 2019-03-20 PROCEDURE — ZZZZZ: CPT | Mod: GE

## 2019-03-20 RX ORDER — RIVAROXABAN 15 MG/1
15 TABLET, FILM COATED ORAL
Refills: 0 | Status: ACTIVE | COMMUNITY

## 2019-03-20 NOTE — HISTORY OF PRESENT ILLNESS
[Approximately ___ (Month)] : the LMP was approximately [unfilled] month(s) ago [Sexually Active] : is sexually active [Monogamous] : is monogamous [Male ___] : [unfilled] male

## 2019-03-22 LAB — PROT S FREE PPP-ACNC: 148.8 % — HIGH (ref 70–130)

## 2019-03-23 NOTE — PHYSICAL EXAM
[Awake] : awake [Alert] : alert [Nipple Discharge] : nipple discharge [Soft] : soft [Oriented x3] : oriented to person, place, and time [No Lesions] : no genitalia lesions [Normal] : uterus [Labia Majora] : labia major [Pink Rugae] : pink rugae [Normal Position] : in a normal position [Uterine Adnexae] : were not tender and not enlarged [RRR, No Murmurs] : RRR, no murmurs [CTAB] : CTAB [Acute Distress] : no acute distress [Mass] : no breast mass [Axillary LAD] : no axillary lymphadenopathy [Tender] : non tender [Distended] : not distended [H/Smegaly] : no hepatosplenomegaly [Depressed Mood] : not depressed [Flat Affect] : affect not flat [Labia Majora Erythema] : no erythema of the labia majora [Discharge] : had no discharge [Tenderness] : nontender [Enlarged ___ wks] : not enlarged [Mass ___ cm] : no uterine mass was palpated [Adnexa Tenderness] : were not tender

## 2019-03-25 ENCOUNTER — APPOINTMENT (OUTPATIENT)
Dept: HEMATOLOGY ONCOLOGY | Facility: CLINIC | Age: 24
End: 2019-03-25

## 2019-03-25 ENCOUNTER — RESULT REVIEW (OUTPATIENT)
Age: 24
End: 2019-03-25

## 2019-03-25 VITALS
HEIGHT: 60.04 IN | OXYGEN SATURATION: 99 % | SYSTOLIC BLOOD PRESSURE: 110 MMHG | TEMPERATURE: 98 F | HEART RATE: 64 BPM | WEIGHT: 155.43 LBS | DIASTOLIC BLOOD PRESSURE: 70 MMHG | RESPIRATION RATE: 16 BRPM | BODY MASS INDEX: 30.12 KG/M2

## 2019-03-25 DIAGNOSIS — I82.890 ACUTE EMBOLISM AND THROMBOSIS OF OTHER SPECIFIED VEINS: ICD-10-CM

## 2019-03-25 LAB
APTT BLD: 39.4 SEC — HIGH (ref 27.5–36.3)
HCT VFR BLD CALC: 32.4 % — LOW (ref 34.5–45)
HGB BLD-MCNC: 10.6 G/DL — LOW (ref 11.5–15.5)
INR BLD: 1.65 RATIO — HIGH (ref 0.88–1.16)
MCHC RBC-ENTMCNC: 25.8 PG — LOW (ref 27–34)
MCHC RBC-ENTMCNC: 32.7 G/DL — SIGNIFICANT CHANGE UP (ref 32–36)
MCV RBC AUTO: 78.9 FL — LOW (ref 80–100)
PLATELET # BLD AUTO: 290 K/UL — SIGNIFICANT CHANGE UP (ref 150–400)
PROTHROM AB SERPL-ACNC: 18.9 SEC — HIGH (ref 10–13.1)
RBC # BLD: 4.1 M/UL — SIGNIFICANT CHANGE UP (ref 3.8–5.2)
RBC # FLD: 15.3 % — HIGH (ref 10.3–14.5)
THROMBIN TIME: 24.4 SEC — SIGNIFICANT CHANGE UP (ref 16–25)
WBC # BLD: 4.9 K/UL — SIGNIFICANT CHANGE UP (ref 3.8–10.5)
WBC # FLD AUTO: 4.9 K/UL — SIGNIFICANT CHANGE UP (ref 3.8–10.5)

## 2019-03-26 LAB
APTT 50/50 2HOUR INCUB: 42.6 SEC — HIGH (ref 27.5–37.4)
APTT 50/50 MIX COMMENT: SIGNIFICANT CHANGE UP
APTT BLD: 37.6 SEC — HIGH (ref 27.5–37.4)
APTT BLD: 39.4 SEC — HIGH (ref 27.5–36.3)
DNA PLOIDY SPEC FC-IMP: NORMAL — SIGNIFICANT CHANGE UP
PAT CTL 2H: 39.8 SEC — HIGH (ref 27.5–37.4)
PTR INTERPRETATION: NORMAL — SIGNIFICANT CHANGE UP

## 2019-03-27 ENCOUNTER — APPOINTMENT (OUTPATIENT)
Dept: OBGYN | Facility: HOSPITAL | Age: 24
End: 2019-03-27

## 2019-03-29 LAB
CARDIOLIPIN IGM SER-MCNC: 3.48 MPL — SIGNIFICANT CHANGE UP (ref 0–11)
CARDIOLIPIN IGM SER-MCNC: 5.68 GPL — SIGNIFICANT CHANGE UP (ref 0–23)

## 2019-03-29 RX ORDER — RIVAROXABAN 20 MG/1
20 TABLET, FILM COATED ORAL
Qty: 90 | Refills: 0 | Status: ACTIVE | COMMUNITY
Start: 2019-03-29 | End: 1900-01-01

## 2019-03-29 NOTE — ASSESSMENT
[FreeTextEntry1] : 24 yo female with recent pregnancy, s/p   initially presented to [facility] (4 weeks post-partum) with SOB and right pelvic pain. She underwent CT C/A/P that showed a right gonadal vein thrombosis. She was started on Rivaroxaban and discharged with hematology follow-up.

## 2019-03-29 NOTE — PHYSICAL EXAM
[Fully active, able to carry on all pre-disease performance without restriction] : Status 0 - Fully active, able to carry on all pre-disease performance without restriction [Normal] : affect appropriate [Ulcers] : no ulcers [Mucositis] : no mucositis [Thrush] : no thrush [de-identified] : Well Nourished and in no acute distress [de-identified] : normal appearance

## 2019-03-29 NOTE — REVIEW OF SYSTEMS
[Abdominal Pain] : abdominal pain [Constipation] : constipation [Muscle Pain] : muscle pain [Fever] : no fever [Night Sweats] : no night sweats [Vision Problems] : no vision problems [Chest Pain] : no chest pain [Palpitations] : no palpitations [Lower Ext Edema] : no lower extremity edema [Shortness Of Breath] : no shortness of breath [Wheezing] : no wheezing [Cough] : no cough [SOB on Exertion] : no shortness of breath during exertion [Diarrhea] : no diarrhea [Dysuria] : no dysuria [Vaginal Discharge] : no vaginal discharge [Dysmenorrhea/Abn Vaginal Bleeding] : no dysmenorrhea/abnormal vaginal bleeding [Joint Pain] : no joint pain [Easy Bleeding] : no tendency for easy bleeding [Easy Bruising] : no tendency for easy bruising [Swollen Glands] : no swollen glands

## 2019-03-29 NOTE — HISTORY OF PRESENT ILLNESS
[de-identified] : 22 yo female with recent pregnancy, s/p  , which unfortunately resulted in fetal demise (due to a brain malformation) initially presented to [facility] (4 weeks post-partum) with SOB and right pelvic pain. She underwent CT C/A/P that showed a right gonadal vein thrombosis. She was started on Rivaroxaban and discharged with hematology follow-up.\par \par \par Interval Hx:\par 3/25/2019: Patient was seen and examined with her fiancee present. She reports that during her pregnancy she was having nose bleeding a couple times a week. This only occurred during pregnancy, before this past pregnancy she never experienced nose bleeds and  after delivery she has not experienced bleeding. Her  scar is healing well, but she does have some discomfort that is slowly improving. She denies any bleeding or clotting disorders in her immediate family members. She does have a paternal aunt who was treated for an unprovoked right leg DVT. Ms Bain is originally from [place] and lives mainly in [facility], and plans to return to [place]  until at least .

## 2019-03-29 NOTE — RESULTS/DATA
[FreeTextEntry1] : 2019 23:15 Complete Blood Count + Automated\par Diff\par Result Date/Time: 2019 00:00 Results Available\par WBC Count 9.36 [3.8-10.5 K/uL] Final\par RBC Count 3.37 L [3.80-5.20 M/uL] Final\par Hemoglobin 8.8 L [11.5-15.5 g/dL] Final\par Hematocrit 28.7 L [34.5-45.0 %] Final\par Mean Cell Volume 85.2 [80.0-100.0 fL] Final\par Mean Cell Hemoglobin 26.1 L [27.0-34.0 pg] Final\par Mean Cell Hemoglobin 30.7 L [32.0-36.0 %] Final\par Conc\par Red Cell Distrib Width 15.2 H [10.3-14.5 %] Final\par Platelet Count - Automated 143 L [150-400 K/uL] Final\par MPV 13.0 [7.0-13.0 fl] Final\par Auto Neutrophil # 7.98 H [1.8-7.4 K/uL] Final\par Auto Lymphocyte # 1.09 [1.0-3.3 K/uL] Final\par Auto Monocyte # 0.16 [0.0-0.9 K/uL] Final\par Auto Eosinophil # 0.00 [0.0-0.5 K/uL] Final\par \par ---------------------------------------------------------------------------------------------------------\par \par EXAM: MR FETAL SNGL OR 1ST GESTATION\par *** ADDENDUM 2019 ***\par Fetal MRI\par History: 23-year-old female carrying a azul pregnancy at a\par gestational age of 36 weeks and 1 day based on DEANA of 3/3/2019\par Technique: Multiplanar multisequence MR was performed with the patient in\par the decubitus position.\par Comparison: None\par Findings: Single intrauterine pregnancy with the fetus in the cephalic\par position. There is a normal amount of amniotic fluid. The placenta is\par posterior-fundal. A three-vessel cord is noted.\par The lungs are symmetric and normal in signal. The heart is massively\par enlarged. There are no pleural or pericardial effusions. There is no\par congenital diaphragmatic hernia. Fluid is seen within the fetal stomach\par and urinary bladder. Kidneys are normal in position and appearance. There\par is mild left hydronephrosis. There is no ascites. There are no abdominal\par cysts or masses. External genitalia have the appearance of a male.\par Impression:\par Azul intrauterine pregnancy at a gestational age of 36 weeks 1 day\par with massive cardiomegaly and mild left hydronephrosis noted. There are\par no pleural or pericardial effusion seen.\par *** END OF ADDENDUM 2019 ***\par PROCEDURE DATE: 2019\par INTERPRETATION: History: Ventriculomegaly, vein of Priyank aneurysmal\par malformation.\par Comparison: None available.\par Technique: Multiplanar multisequence MR images were obtained at 1.5 [place].\par Findings:\par \par The expected date of delivery is 3/3/2019. The estimated gestational age\par is 36 weeks and 1/7 days.\par Evaluation of the brain, face and spine:\par There is a vein of Priyank aneurysmal malformation measuring approximately\par 2.7 cm x 2.6 cm x 2.7 cm (craniocaudal by mediolateral by\par anterior-posterior). 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 The posterior aspect of the\par sagittal sinus and bilateral transverse sinuses are markedly enlarged as\par are the sigmoid sinuses. There is marked enlargement of the basilar\par artery and bilateral internal carotid arteries with innumerable signal\par voids appreciated about the vein of Priyank aneurysmal malformation. There\par is enlargement of the third and lateral ventricles. There is compression\par of the cerebral aqueduct and mass effect exerted on the superior\par cerebellar vermis by the vein of Priyank aneurysmal malformation. No\par parenchymal hemorrhage is identified. No diffusion restriction is\par identified within the brain parenchyma. No abnormalities of the face are\par present. 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 No parenchymal hemorrhage is identified.\par ***Please see the addendum at the top of this report. It may contain\par additional important information or changes.****\par \par SUPRIYA LÓPEZ M.D., ATTENDING RADIOLOGIST\par This document has been electronically signed. 2019 3:34PM\par Addend: CARIE BOSTON M.D.,ATTENDING RADIOLOGIST\par This addendum was electronically signed on: 2019 10:01AM.\par \par ---------------------------------------------------------------------------------------------------------\par \par 14-Mar-2019 09:40 Comprehensive Metabolic Panel Result Date/Time: 14-Mar-2019 10:18 Results Available\par Sodium, Serum 142 [135-145 mmol/L] Final\par Potassium, Serum 4.1 [3.5-5.3 mmol/L] Final\par Chloride, Serum 107 [ mmol/L] Final\par Carbon Dioxide, Serum 23 [22-31 mmol/L] Final\par Anion Gap, Serum 12 [7-14 mmo/L] Final\par Blood Urea Nitrogen, 8 [7-23 mg/dL] Final\par Serum\par Creatinine, Serum 0.58 [0.5-1.3 mg/dL] Final\par Glucose, Serum 90 [70-99 mg/dL] Final\par Calcium, Total Serum 10.2 [8.4-10.5 mg/dL] Final\par Protein Total, Serum 7.5 [6.0-8.3 g/dL] Final\par Albumin, Serum 4.2 [3.3-5.0 g/dL] Final\par Bilirubin Total, Serum < 0.2 L [0.2-1.2 mg/dL] Final\par Alkaline Phosphatase, 87 [ u/L] Final\par Serum\par Aspartate 20 [4-32 u/L] Final\par Aminotransferase\par (AST/SGOT)\par Alanine Aminotransferase 17 [4-33 u/L] Final\par (ALT/SGPT)\par eGFR if Non  129 [>=60 mL/min] Final\par American\par The units for eGFR are ml/min/1.73m2 (normalized body\par surface area). The eGFR is calculated from a serum\par creatinine using the CKD-EPI equation. Other variables\par required for calculation are race, age and sex. Among\par patients with chronic kidney disease (CKD), the eGFR is\par useful in determining the stage of disease according to\par KDOQI CKD classification. All eGFR results are reported\par numerically with the following interpretation.\par GFR\par (ml/min/1.73 m2) W/KIDNEY DAMAGE W/O KIDNEY DMG\par ==========================================================\par >= 90.......................Stage 1..............Normal\par 60-89.......................Stage 2...........Decreased GFR\par 30-59.......................Stage 3..............Stage 3\par 15-29.......................Stage 4..............Stage 4\par < 15........................Stage 5..............Stage 5\par Each stage of CKD assumes that the associated GFR level\par has been in effect for at least 3 months. Determination of\par stages one and two (with eGFR > 59ml/min/m2) requires\par estimation of kidney damage for at least 3 months as\par defined by structural or functional abnormalities.\par Limitations: All estimates of GFR will be less accurate\par for patients at extremes of muscle mass (including but\par not limited to frail elderly, critically ill, or cancer\par patients), those with unusual diets, and those with\par conditions associated with reduced secretion or\par extrarenal elimination of creatinine. The eGFR equation\par is not recommended for use in patients with unstable\par creatinine levels.\par eGFR if African American 150 [>=60 mL/min] Final\par \par ---------------------------------------------------------------------------------------------------------\par \par CT Abdomen and Pelvis w/ Final\par IV Cont\par EXAM: CT ABDOMEN AND PELVIS IC\par EXAM: CT ANGIO CHEST (W)AW IC\par PROCEDURE DATE: Mar 14 2019\par INTERPRETATION: Clinical indications: Shortness of breath, right lower\par quadrant pain, status post .\par CT pulmonary angiogram was performed following intravenous administration\par of 90 cc of Omnipaque 350. 10 cc of contrast was discarded. MIP images\par are submitted. CT of the abdomen and pelvis was also performed.\par This study is limited for evaluation of some of the segmental and\par subsegmental pulmonary arterial branches due to poor contrast\par opacification respiratory motion artifact. No pulmonary arterial emboli\par are identified.\par There are no enlarged bilateral axillary, mediastinal or hilar lymph\par nodes. There is no pericardial effusion. There is a trace left pleural\par effusion.\par Evaluation of the lungs demonstrate no pneumonia. There are no lung\par nodules. There are no central endobronchial lesions.\par The liver, gallbladder, spleen, pancreas, adrenal glands, kidneys,\par urinary bladder are unremarkable. The uterus and the adnexa are\par unremarkable given the recent . Trace free fluid is noted within\par the pelvis. There is no bowel obstruction or free intraperitoneal air.\par There is a flat-shaped small amount of fluid between the uterus and the\par bladder representing a bladder flap hematoma. 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 Murray on 2019 at 12:51 PM with read\par back.\par DAVY HO M.D. ATTENDING RADIOLOGIST\par This document has been electronically signed. Mar 14 2019 12:53PM\par \par \par ---------------------------------------------------------------------------------------------------------\par \par 14-Mar-2019 11:47 CT Angio Chest w/ IV Cont Result Date/Time: 14-Mar-2019 12:56 Results Available\par PACS Image Image(s) Available Final\par CT Angio Chest w/ IV Cont Final\par EXAM: CT ABDOMEN AND PELVIS IC\par EXAM: CT ANGIO CHEST (W)AW IC\par PROCEDURE DATE: Mar 14 2019\par INTERPRETATION: Clinical indications: Shortness of breath, right lower\par quadrant pain, status post .\par CT pulmonary angiogram was performed following intravenous administration\par of 90 cc of Omnipaque 350. 10 cc of contrast was discarded. MIP images\par are submitted. CT of the abdomen and pelvis was also performed.\par This study is limited for evaluation of some of the segmental and\par subsegmental pulmonary arterial branches due to poor contrast\par opacification respiratory motion artifact. No pulmonary arterial emboli\par are identified. There are no enlarged bilateral axillary, mediastinal or hilar lymph\par nodes. There is no pericardial effusion. There is a trace left pleural\par effusion.\par Evaluation of the lungs demonstrate no pneumonia. There are no lung\par nodules. There are no central endobronchial lesions.\par The liver, gallbladder, spleen, pancreas, adrenal glands, kidneys,\par urinary bladder are unremarkable. The uterus and the adnexa are\par unremarkable given the recent . Trace free fluid is noted within\par the pelvis. There is no bowel obstruction or free intraperitoneal air.\par There is a flat-shaped small amount of fluid between the uterus and the\par bladder representing a bladder flap hematoma. 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 Murray on 2019 at 12:51 PM with read\par back.\par DAVY HO M.D. ATTENDING RADIOLOGIST\par This document has been electronically signed. Mar 14 2019 12:53PM\par \par \par ---------------------------------------------------------------------------------------------------------\par \par US Duplex Venous Lower Final\par Ext Complete, Bilateral\par EXAM: US DPLX LWR EXT VEINS COMPL BI\par PROCEDURE DATE: Mar 14 2019\par INTERPRETATION: CLINICAL INFORMATION: Shortness of breath. History of\par  section 1 month ago.\par COMPARISON: None available.\par TECHNIQUE: Duplex sonography of the BILATERAL LOWER extremities with\par color and spectral Doppler, with and without compression.\par FINDINGS:\par There is normal compressibility of the bilateral common femoral, femoral\par and popliteal veins. No calf vein thrombosis is detected.\par Doppler examination shows normal spontaneous and phasic flow.\par IMPRESSION:\par No evidence of bilateral lower extremity deep venous thrombosis.\par ISABEL TORRE D.O., RADIOLOGY RESIDENT\par This document has been electronically signed.\par YURIDIA DUNCAN M.D.,ATTENDING RADIOLOGIST\par \par \par ---------------------------------------------------------------------------------------------------------\par \par Study Date: 3/15/2019\par Location: CDUSonographer: ANETA Pelayo\par Study quality: Technically good\par Referring Physician: Lashay Nix MD\par Blood Pressure: 127/77 mmHg\par Height: 152 cm\par Weight: 69 kg\par BSA: 1.7 m2\par \par PROCEDURE: Transthoracic echocardiogram with 2-D, M-Mode\par and complete spectral and color flow Doppler.\par INDICATION: Abnormal electrocardiogram (ECG) (EKG) (R94.31)\par \par DIMENSIONS:\par Dimensions: Normal Values:\par LA: 3.2 cm 2.0 - 4.0 cm\par Ao: 2.9 cm 2.0 - 3.8 cm\par SEPTUM: 0.6 cm 0.6 - 1.2 cm\par PWT: 0.6 cm 0.6 - 1.1 cm\par LVIDd: 4.7 cm 3.0 - 5.6 cm\par LVIDs: 3.0 cm 1.8 - 4.0 cm\par Derived Variables:\par LVMI: 51 g/m2\par RWT: 0.25\par Fractional short: 36 %\par Ejection Fraction (Teicholtz): 66 %\par \par OBSERVATIONS:\par Mitral Valve: Normal mitral valve. Minimal mitral\par regurgitation.\par Aortic Root: Normal aortic root.\par Aortic Valve: Normal trileaflet aortic valve.\par Left Atrium: Normal left atrium. LA volume index = 26\par cc/m2.\par Left Ventricle: Normal left ventricular systolic function.\par No segmental wall motion abnormalities. Normal left\par ventricular internal dimensions and wall thicknesses.\par Right Heart: Normal right atrium. Normal right ventricular\par size and function. Normal tricuspid valve. Minimal\par tricuspid regurgitation. Normal pulmonic valve. Mild\par pulmonic regurgitation.\par Pericardium/PleuraNormal pericardium with no pericardial\par effusion.\par \par CONCLUSIONS:\par 1. Normal mitral valve. Minimal mitral regurgitation.\par 2. Normal left ventricular internal dimensions and wall\par thicknesses.\par 3. Normal left ventricular systolic function. No segmental\par wall motion abnormalities.\par 4. Normal right ventricular size and function.\par \par Confirmed on 3/15/2019 - 15:11:47 by Joshua Osman M.D.\par \par ---------------------------------------------------------------------------------------------------------\par \par 16-Mar-2019 07:38 Complete Blood Count Result Date/Time: 16-Mar-2019 09:13 Results Available\par WBC Count 4.20 [3.8-10.5 K/uL] Final\par RBC Count 4.37 [3.80-5.20 M/uL] Final\par Hemoglobin 10.6 L [11.5-15.5 g/dL] Final\par Hematocrit 36.9 [34.5-45.0 %] Final\par Mean Cell Volume 84.4 [80.0-100.0 fL] Final\par Mean Cell Hemoglobin 24.3 L [27.0-34.0 pg] Final\par Mean Cell Hemoglobin 28.7 L [32.0-36.0 %] Final\par Conc\par Red Cell Distrib Width 15.9 H [10.3-14.5 %] Final\par Platelet Count - Automated 267 [150-400 K/uL] Final\par MPV 11.1 [7.0-13.0 fl] Final\par Nucleated RBC # 0 L [ K/uL] Final\par \par 16-Mar-2019 13:31 Antithrombin III Assay with Reflex Result Date/Time: 16-Mar-2019 15:26 Results Available\par Antithrombin III Assay with 118 [ %] Final\par Reflex\par \par 16-Mar-2019 13:31 Protein C Functional Assay with\par Reflex\par Result Date/Time: 18-Mar-2019 13:17 Results Available\par Protein C Functional Assay 113 [ %] Final\par with Reflex\par \par 16-Mar-2019 13:31 Protein S Free Activity Assay Result Date/Time: 22-Mar-2019 14:44 Results Available\par Protein S Free Activity 148.8 H [ %] Final\par Assay\par \par 16-Mar-2019 13:31 Lupus Profile Result Date/Time: 18-Mar-2019 13:17 Results Available\par Prothrombin Time, Plasma 19.7 H [9.8-13.1 SEC] Final\par INR 1.70 H [0.88-1.17] Final\par Activated Partial 43.0 H [27.5-36.3 SEC] Final\par Thromboplastin Time\par Recommended therapeutic heparin range (full dose) for APTT\par is 58-99 seconds.\par Recommended therapeutic argatroban range is 1.5 to 3.0 times\par the baseline APTT value, not to exceed 100 seconds.\par Recommended therapeutic refludan range is 1.5 to 2.5 times\par the baseline APTT.\par Thrombin Time Assay 25.7 H [16.0-25.0 SEC] Final\par DRVVT S/C Ratio 1.16 [0-1.2] Final\par RATIO > 2.0 LA IS STRONGLY PRESENT\par RATIO 1.5 - 2.0 LA IS MODERATELY PRESENT\par RATIO 1.2 - 1.5 LA IS WEAKLY PRESENT\par Silica Clotting Time S/C 0.75 L [0.88-1.27] Final\par Ratio\par NOTE: THE PRESENCE OF DIRECT THROMBIN INHIBITORS (SUCH AS\par ARGATROBAN, REFLUDAN), UF HEPARIN >0.5 U/ml OR LMW HEPARIN\par >1.0 U/ml MAY AFFECT RESULTS.\par RATIO > 1.27 IS POSITIVE FOR LUPUS.\par RATIO <= 1.27 IS NEGATIVE FOR LUPUS.\par \par \par ---------------------------------------------------------------------------------------------------------

## 2019-04-07 ENCOUNTER — TRANSCRIPTION ENCOUNTER (OUTPATIENT)
Age: 24
End: 2019-04-07

## 2020-12-14 NOTE — PROGRESS NOTE ADULT - SUBJECTIVE AND OBJECTIVE BOX
Patient is a 23y old  Female who presents with a chief complaint of GILL (15 Mar 2019 19:16)      SUBJECTIVE / OVERNIGHT EVENTS:  No complaints.     MEDICATIONS  (STANDING):  docusate sodium 100 milliGRAM(s) Oral three times a day  rivaroxaban 15 milliGRAM(s) Oral two times a day    MEDICATIONS  (PRN):  senna 2 Tablet(s) Oral at bedtime PRN Constipation      Vital Signs Last 24 Hrs  T(C): 36.9 (16 Mar 2019 09:40), Max: 37.4 (15 Mar 2019 14:00)  T(F): 98.4 (16 Mar 2019 09:40), Max: 99.3 (15 Mar 2019 14:00)  HR: 68 (16 Mar 2019 09:40) (40 - 68)  BP: 100/51 (16 Mar 2019 09:40) (100/51 - 129/70)  BP(mean): --  RR: 18 (16 Mar 2019 09:40) (15 - 18)  SpO2: 100% (16 Mar 2019 09:40) (100% - 100%)  CAPILLARY BLOOD GLUCOSE        I&O's Summary      PHYSICAL EXAM:  GENERAL: NAD, well-developed  HEAD:  Atraumatic, Normocephalic  EYES: EOMI, PERRLA, conjunctiva and sclera clear  NECK: Supple, No JVD  CHEST/LUNG: Clear to auscultation bilaterally; No wheeze  HEART: Regular rate and rhythm; No murmurs, rubs, or gallops  ABDOMEN: Soft, Nontender, Nondistended; Bowel sounds present  EXTREMITIES:  2+ Peripheral Pulses, No clubbing, cyanosis, or edema  PSYCH: AAOx3  NEUROLOGY: non-focal  SKIN: No rashes or lesions    LABS:                        10.6   4.20  )-----------( 267      ( 16 Mar 2019 07:38 )             36.9     03-16    141  |  104  |  13  ----------------------------<  81  4.0   |  21<L>  |  0.60    Ca    9.9      16 Mar 2019 07:30  Mg     1.9     03-16      PT/INR - ( 14 Mar 2019 19:50 )   PT: 19.7 SEC;   INR: 1.74                    RADIOLOGY & ADDITIONAL TESTS:    Imaging Personally Reviewed:  TTE reviewed:   1. Normal mitral valve. Minimal mitral regurgitation.  2. Normal left ventricular internal dimensions and wall  thicknesses.  3. Normal left ventricular systolic function. No segmental  wall motion abnormalities.  4. Normal right ventricular size and function.    Consultant(s) Notes Reviewed:      Care Discussed with Consultants/Other Providers: spoke w/ Dr Ariza from EP, bradycardia likely vagally mediated [phone], [phone]

## 2021-02-12 NOTE — PATIENT PROFILE ADULT - NSASFUNCLEVELADLTOILET_GEN_A_NUR
I have personally evaluated and examined the patient. The Attending was available to me as a supervising provider if needed. 0 = independent

## 2021-08-05 NOTE — DISCHARGE NOTE OB - YES, WALK AS TOLERATED
Patient's son called. Patient passed away on 07-30-21. Patient's son knows Dr. Larsen and staff are off today.  
Statement Selected

## 2022-05-16 NOTE — PATIENT PROFILE OB - NSABNHEARTRATE_OBGYN_ALL_OB
Abnormal Fetal Heart Rate Category II Dupixent Pregnancy And Lactation Text: This medication likely crosses the placenta but the risk for the fetus is uncertain. This medication is excreted in breast milk.

## 2025-05-30 ENCOUNTER — APPOINTMENT (OUTPATIENT)
Dept: INTERNAL MEDICINE | Facility: CLINIC | Age: 30
End: 2025-05-30
Payer: COMMERCIAL

## 2025-05-30 ENCOUNTER — NON-APPOINTMENT (OUTPATIENT)
Age: 30
End: 2025-05-30

## 2025-05-30 VITALS
BODY MASS INDEX: 36.52 KG/M2 | OXYGEN SATURATION: 98 % | DIASTOLIC BLOOD PRESSURE: 60 MMHG | SYSTOLIC BLOOD PRESSURE: 98 MMHG | RESPIRATION RATE: 16 BRPM | HEART RATE: 69 BPM | HEIGHT: 60 IN | WEIGHT: 186 LBS

## 2025-05-30 VITALS — DIASTOLIC BLOOD PRESSURE: 60 MMHG | SYSTOLIC BLOOD PRESSURE: 100 MMHG

## 2025-05-30 DIAGNOSIS — I82.890 ACUTE EMBOLISM AND THROMBOSIS OF OTHER SPECIFIED VEINS: ICD-10-CM

## 2025-05-30 DIAGNOSIS — Z13.9 ENCOUNTER FOR SCREENING, UNSPECIFIED: ICD-10-CM

## 2025-05-30 DIAGNOSIS — Z00.00 ENCOUNTER FOR GENERAL ADULT MEDICAL EXAMINATION W/OUT ABNORMAL FINDINGS: ICD-10-CM

## 2025-05-30 DIAGNOSIS — D64.9 ANEMIA, UNSPECIFIED: ICD-10-CM

## 2025-05-30 LAB
ALBUMIN SERPL ELPH-MCNC: 4.5 G/DL
ALP BLD-CCNC: 68 U/L
ALT SERPL-CCNC: 17 U/L
ANION GAP SERPL CALC-SCNC: 11 MMOL/L
AST SERPL-CCNC: 17 U/L
BILIRUB SERPL-MCNC: 0.3 MG/DL
BUN SERPL-MCNC: 8 MG/DL
CALCIUM SERPL-MCNC: 9.6 MG/DL
CHLORIDE SERPL-SCNC: 105 MMOL/L
CHOLEST SERPL-MCNC: 296 MG/DL
CO2 SERPL-SCNC: 22 MMOL/L
CREAT SERPL-MCNC: 0.54 MG/DL
EGFRCR SERPLBLD CKD-EPI 2021: 127 ML/MIN/1.73M2
FERRITIN SERPL-MCNC: 214 NG/ML
FOLATE SERPL-MCNC: 16.8 NG/ML
GLUCOSE SERPL-MCNC: 84 MG/DL
HCG SERPL QL: NEGATIVE
HDLC SERPL-MCNC: 60 MG/DL
IRON SATN MFR SERPL: 19 %
IRON SERPL-MCNC: 64 UG/DL
LDLC SERPL-MCNC: 232 MG/DL
NONHDLC SERPL-MCNC: 236 MG/DL
PAPP-A SERPL-ACNC: <1 MIU/ML
POTASSIUM SERPL-SCNC: 4.4 MMOL/L
PROT SERPL-MCNC: 7.4 G/DL
SODIUM SERPL-SCNC: 138 MMOL/L
TIBC SERPL-MCNC: 341 UG/DL
TRIGL SERPL-MCNC: 42 MG/DL
TSH SERPL-ACNC: 1.28 UIU/ML
UIBC SERPL-MCNC: 276 UG/DL
VIT B12 SERPL-MCNC: 450 PG/ML

## 2025-05-30 PROCEDURE — 99385 PREV VISIT NEW AGE 18-39: CPT

## 2025-05-30 PROCEDURE — 93000 ELECTROCARDIOGRAM COMPLETE: CPT

## 2025-05-31 LAB
BASOPHILS # BLD AUTO: 0.03 K/UL
BASOPHILS NFR BLD AUTO: 0.6 %
DEPRECATED D DIMER PPP IA-ACNC: 310 NG/ML DDU
EOSINOPHIL # BLD AUTO: 0.14 K/UL
EOSINOPHIL NFR BLD AUTO: 3 %
ESTIMATED AVERAGE GLUCOSE: 97 MG/DL
HBA1C MFR BLD HPLC: 5 %
HCT VFR BLD CALC: 32.8 %
HGB BLD-MCNC: 10.1 G/DL
IMM GRANULOCYTES NFR BLD AUTO: 0.4 %
LYMPHOCYTES # BLD AUTO: 1.74 K/UL
LYMPHOCYTES NFR BLD AUTO: 36.8 %
MAN DIFF?: NORMAL
MCHC RBC-ENTMCNC: 25.8 PG
MCHC RBC-ENTMCNC: 30.8 G/DL
MCV RBC AUTO: 83.9 FL
MONOCYTES # BLD AUTO: 0.23 K/UL
MONOCYTES NFR BLD AUTO: 4.9 %
NEUTROPHILS # BLD AUTO: 2.57 K/UL
NEUTROPHILS NFR BLD AUTO: 54.3 %
PLATELET # BLD AUTO: 279 K/UL
RBC # BLD: 3.91 M/UL
RBC # FLD: 14.7 %
WBC # FLD AUTO: 4.73 K/UL

## 2025-06-05 ENCOUNTER — APPOINTMENT (OUTPATIENT)
Dept: PULMONOLOGY | Facility: CLINIC | Age: 30
End: 2025-06-05
Payer: COMMERCIAL

## 2025-06-05 VITALS
BODY MASS INDEX: 35.34 KG/M2 | OXYGEN SATURATION: 100 % | HEART RATE: 71 BPM | DIASTOLIC BLOOD PRESSURE: 73 MMHG | HEIGHT: 60 IN | SYSTOLIC BLOOD PRESSURE: 102 MMHG | WEIGHT: 180 LBS

## 2025-06-05 DIAGNOSIS — R06.02 SHORTNESS OF BREATH: ICD-10-CM

## 2025-06-05 PROCEDURE — 99205 OFFICE O/P NEW HI 60 MIN: CPT | Mod: 25

## 2025-06-05 PROCEDURE — 94729 DIFFUSING CAPACITY: CPT

## 2025-06-05 PROCEDURE — 94727 GAS DIL/WSHOT DETER LNG VOL: CPT

## 2025-06-05 PROCEDURE — 94060 EVALUATION OF WHEEZING: CPT

## 2025-06-13 ENCOUNTER — OUTPATIENT (OUTPATIENT)
Dept: OUTPATIENT SERVICES | Facility: HOSPITAL | Age: 30
LOS: 1 days | End: 2025-06-13
Payer: COMMERCIAL

## 2025-06-13 ENCOUNTER — APPOINTMENT (OUTPATIENT)
Dept: CT IMAGING | Facility: CLINIC | Age: 30
End: 2025-06-13
Payer: COMMERCIAL

## 2025-06-13 DIAGNOSIS — Z98.891 HISTORY OF UTERINE SCAR FROM PREVIOUS SURGERY: Chronic | ICD-10-CM

## 2025-06-13 DIAGNOSIS — R06.02 SHORTNESS OF BREATH: ICD-10-CM

## 2025-06-13 PROCEDURE — 71275 CT ANGIOGRAPHY CHEST: CPT | Mod: 26

## 2025-06-13 PROCEDURE — 71275 CT ANGIOGRAPHY CHEST: CPT

## 2025-06-25 RX ORDER — ATORVASTATIN CALCIUM 40 MG/1
40 TABLET, FILM COATED ORAL DAILY
Qty: 90 | Refills: 0 | Status: ACTIVE | COMMUNITY
Start: 2025-06-25 | End: 1900-01-01

## 2025-07-10 ENCOUNTER — APPOINTMENT (OUTPATIENT)
Dept: SLEEP CENTER | Facility: CLINIC | Age: 30
End: 2025-07-10